# Patient Record
Sex: FEMALE | Race: WHITE | NOT HISPANIC OR LATINO | Employment: UNEMPLOYED | URBAN - METROPOLITAN AREA
[De-identification: names, ages, dates, MRNs, and addresses within clinical notes are randomized per-mention and may not be internally consistent; named-entity substitution may affect disease eponyms.]

---

## 2024-10-16 ENCOUNTER — LAB VISIT (OUTPATIENT)
Dept: LAB | Facility: HOSPITAL | Age: 29
End: 2024-10-16
Attending: NURSE PRACTITIONER
Payer: COMMERCIAL

## 2024-10-16 ENCOUNTER — OFFICE VISIT (OUTPATIENT)
Dept: FAMILY MEDICINE | Facility: CLINIC | Age: 29
End: 2024-10-16
Payer: COMMERCIAL

## 2024-10-16 VITALS
SYSTOLIC BLOOD PRESSURE: 112 MMHG | DIASTOLIC BLOOD PRESSURE: 80 MMHG | HEIGHT: 65 IN | HEART RATE: 65 BPM | TEMPERATURE: 98 F | WEIGHT: 225.38 LBS | BODY MASS INDEX: 37.55 KG/M2

## 2024-10-16 DIAGNOSIS — E66.812 CLASS 2 OBESITY WITHOUT SERIOUS COMORBIDITY WITH BODY MASS INDEX (BMI) OF 37.0 TO 37.9 IN ADULT, UNSPECIFIED OBESITY TYPE: ICD-10-CM

## 2024-10-16 DIAGNOSIS — K76.0 FATTY LIVER: ICD-10-CM

## 2024-10-16 DIAGNOSIS — R10.30 LOWER ABDOMINAL PAIN: ICD-10-CM

## 2024-10-16 DIAGNOSIS — F12.90 MARIJUANA USE: ICD-10-CM

## 2024-10-16 DIAGNOSIS — Z00.00 ENCOUNTER FOR WELLNESS EXAMINATION: ICD-10-CM

## 2024-10-16 DIAGNOSIS — Z13.29 SCREENING FOR THYROID DISORDER: ICD-10-CM

## 2024-10-16 DIAGNOSIS — R19.4 CHANGE IN BOWEL HABITS: ICD-10-CM

## 2024-10-16 DIAGNOSIS — Z00.00 ENCOUNTER FOR WELLNESS EXAMINATION: Primary | ICD-10-CM

## 2024-10-16 DIAGNOSIS — Z13.220 SCREENING FOR LIPID DISORDERS: ICD-10-CM

## 2024-10-16 PROBLEM — Z11.4 ENCOUNTER FOR SCREENING FOR HIV: Status: RESOLVED | Noted: 2020-02-20 | Resolved: 2024-10-16

## 2024-10-16 PROBLEM — R10.32 LLQ PAIN: Status: RESOLVED | Noted: 2021-05-10 | Resolved: 2024-10-16

## 2024-10-16 PROBLEM — R10.9 ABDOMINAL PAIN: Chronic | Status: RESOLVED | Noted: 2022-05-23 | Resolved: 2024-10-16

## 2024-10-16 PROBLEM — Z11.8 SCREENING FOR CHLAMYDIAL DISEASE: Status: RESOLVED | Noted: 2019-09-13 | Resolved: 2024-10-16

## 2024-10-16 PROBLEM — Z76.89 REFERRAL OF PATIENT: Status: RESOLVED | Noted: 2022-05-23 | Resolved: 2024-10-16

## 2024-10-16 LAB
ALBUMIN SERPL BCP-MCNC: 3.9 G/DL (ref 3.5–5.2)
ALP SERPL-CCNC: 51 U/L (ref 55–135)
ALT SERPL W/O P-5'-P-CCNC: 29 U/L (ref 10–44)
ANION GAP SERPL CALC-SCNC: 10 MMOL/L (ref 8–16)
AST SERPL-CCNC: 21 U/L (ref 10–40)
BILIRUB SERPL-MCNC: 0.4 MG/DL (ref 0.1–1)
BUN SERPL-MCNC: 10 MG/DL (ref 6–20)
CALCIUM SERPL-MCNC: 9.1 MG/DL (ref 8.7–10.5)
CHLORIDE SERPL-SCNC: 106 MMOL/L (ref 95–110)
CHOLEST SERPL-MCNC: 212 MG/DL (ref 120–199)
CHOLEST/HDLC SERPL: 4.2 {RATIO} (ref 2–5)
CO2 SERPL-SCNC: 24 MMOL/L (ref 23–29)
CREAT SERPL-MCNC: 0.8 MG/DL (ref 0.5–1.4)
ERYTHROCYTE [DISTWIDTH] IN BLOOD BY AUTOMATED COUNT: 13 % (ref 11.5–14.5)
EST. GFR  (NO RACE VARIABLE): >60 ML/MIN/1.73 M^2
ESTIMATED AVG GLUCOSE: 105 MG/DL (ref 68–131)
GLUCOSE SERPL-MCNC: 93 MG/DL (ref 70–110)
HBA1C MFR BLD: 5.3 % (ref 4–5.6)
HCT VFR BLD AUTO: 42.7 % (ref 37–48.5)
HCV AB SERPL QL IA: NORMAL
HDLC SERPL-MCNC: 50 MG/DL (ref 40–75)
HDLC SERPL: 23.6 % (ref 20–50)
HGB BLD-MCNC: 13.9 G/DL (ref 12–16)
HIV 1+2 AB+HIV1 P24 AG SERPL QL IA: NORMAL
LDLC SERPL CALC-MCNC: 137.2 MG/DL (ref 63–159)
MCH RBC QN AUTO: 29.1 PG (ref 27–31)
MCHC RBC AUTO-ENTMCNC: 32.6 G/DL (ref 32–36)
MCV RBC AUTO: 90 FL (ref 82–98)
NONHDLC SERPL-MCNC: 162 MG/DL
PLATELET # BLD AUTO: 320 K/UL (ref 150–450)
PMV BLD AUTO: 9.6 FL (ref 9.2–12.9)
POTASSIUM SERPL-SCNC: 4.3 MMOL/L (ref 3.5–5.1)
PROT SERPL-MCNC: 7.2 G/DL (ref 6–8.4)
RBC # BLD AUTO: 4.77 M/UL (ref 4–5.4)
SODIUM SERPL-SCNC: 140 MMOL/L (ref 136–145)
TRIGL SERPL-MCNC: 124 MG/DL (ref 30–150)
TSH SERPL DL<=0.005 MIU/L-ACNC: 1.34 UIU/ML (ref 0.4–4)
WBC # BLD AUTO: 6.97 K/UL (ref 3.9–12.7)

## 2024-10-16 PROCEDURE — 87389 HIV-1 AG W/HIV-1&-2 AB AG IA: CPT | Performed by: NURSE PRACTITIONER

## 2024-10-16 PROCEDURE — 86803 HEPATITIS C AB TEST: CPT | Performed by: NURSE PRACTITIONER

## 2024-10-16 PROCEDURE — 1159F MED LIST DOCD IN RCRD: CPT | Mod: CPTII,S$GLB,, | Performed by: NURSE PRACTITIONER

## 2024-10-16 PROCEDURE — 3008F BODY MASS INDEX DOCD: CPT | Mod: CPTII,S$GLB,, | Performed by: NURSE PRACTITIONER

## 2024-10-16 PROCEDURE — 85027 COMPLETE CBC AUTOMATED: CPT | Performed by: NURSE PRACTITIONER

## 2024-10-16 PROCEDURE — 3074F SYST BP LT 130 MM HG: CPT | Mod: CPTII,S$GLB,, | Performed by: NURSE PRACTITIONER

## 2024-10-16 PROCEDURE — 3079F DIAST BP 80-89 MM HG: CPT | Mod: CPTII,S$GLB,, | Performed by: NURSE PRACTITIONER

## 2024-10-16 PROCEDURE — 83036 HEMOGLOBIN GLYCOSYLATED A1C: CPT | Performed by: NURSE PRACTITIONER

## 2024-10-16 PROCEDURE — 1160F RVW MEDS BY RX/DR IN RCRD: CPT | Mod: CPTII,S$GLB,, | Performed by: NURSE PRACTITIONER

## 2024-10-16 PROCEDURE — 99395 PREV VISIT EST AGE 18-39: CPT | Mod: S$GLB,,, | Performed by: NURSE PRACTITIONER

## 2024-10-16 PROCEDURE — 80061 LIPID PANEL: CPT | Performed by: NURSE PRACTITIONER

## 2024-10-16 PROCEDURE — 80053 COMPREHEN METABOLIC PANEL: CPT | Performed by: NURSE PRACTITIONER

## 2024-10-16 PROCEDURE — 99999 PR PBB SHADOW E&M-EST. PATIENT-LVL IV: CPT | Mod: PBBFAC,,, | Performed by: NURSE PRACTITIONER

## 2024-10-16 PROCEDURE — 84443 ASSAY THYROID STIM HORMONE: CPT | Performed by: NURSE PRACTITIONER

## 2024-10-16 PROCEDURE — 36415 COLL VENOUS BLD VENIPUNCTURE: CPT | Mod: PO | Performed by: NURSE PRACTITIONER
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
## 2024-10-17 ENCOUNTER — LAB VISIT (OUTPATIENT)
Dept: LAB | Facility: HOSPITAL | Age: 29
End: 2024-10-17
Attending: NURSE PRACTITIONER
Payer: COMMERCIAL

## 2024-10-17 ENCOUNTER — OFFICE VISIT (OUTPATIENT)
Dept: GASTROENTEROLOGY | Facility: CLINIC | Age: 29
End: 2024-10-17
Payer: COMMERCIAL

## 2024-10-17 VITALS — BODY MASS INDEX: 37.87 KG/M2 | HEIGHT: 65 IN | WEIGHT: 227.31 LBS

## 2024-10-17 DIAGNOSIS — F12.90 MARIJUANA USE: ICD-10-CM

## 2024-10-17 DIAGNOSIS — K92.1 HEMATOCHEZIA: ICD-10-CM

## 2024-10-17 DIAGNOSIS — Z87.898 HISTORY OF NAUSEA AND VOMITING: ICD-10-CM

## 2024-10-17 DIAGNOSIS — R19.7 DIARRHEA, UNSPECIFIED TYPE: ICD-10-CM

## 2024-10-17 DIAGNOSIS — R10.9 LEFT SIDED ABDOMINAL PAIN: ICD-10-CM

## 2024-10-17 DIAGNOSIS — K62.89 RECTAL PAIN: ICD-10-CM

## 2024-10-17 DIAGNOSIS — R12 HEARTBURN: ICD-10-CM

## 2024-10-17 DIAGNOSIS — R19.4 CHANGE IN BOWEL HABITS: Primary | ICD-10-CM

## 2024-10-17 DIAGNOSIS — R14.0 BLOATING SYMPTOM: ICD-10-CM

## 2024-10-17 LAB
IGA SERPL-MCNC: 218 MG/DL (ref 40–350)
LIPASE SERPL-CCNC: 17 U/L (ref 4–60)

## 2024-10-17 PROCEDURE — 3044F HG A1C LEVEL LT 7.0%: CPT | Mod: CPTII,S$GLB,, | Performed by: NURSE PRACTITIONER

## 2024-10-17 PROCEDURE — 83690 ASSAY OF LIPASE: CPT | Performed by: NURSE PRACTITIONER

## 2024-10-17 PROCEDURE — 82784 ASSAY IGA/IGD/IGG/IGM EACH: CPT | Performed by: NURSE PRACTITIONER

## 2024-10-17 PROCEDURE — 86364 TISS TRNSGLTMNASE EA IG CLAS: CPT | Performed by: NURSE PRACTITIONER

## 2024-10-17 PROCEDURE — 99204 OFFICE O/P NEW MOD 45 MIN: CPT | Mod: S$GLB,,, | Performed by: NURSE PRACTITIONER

## 2024-10-17 PROCEDURE — 1159F MED LIST DOCD IN RCRD: CPT | Mod: CPTII,S$GLB,, | Performed by: NURSE PRACTITIONER

## 2024-10-17 PROCEDURE — 36415 COLL VENOUS BLD VENIPUNCTURE: CPT | Mod: PO | Performed by: NURSE PRACTITIONER

## 2024-10-17 PROCEDURE — 3008F BODY MASS INDEX DOCD: CPT | Mod: CPTII,S$GLB,, | Performed by: NURSE PRACTITIONER

## 2024-10-17 PROCEDURE — 1160F RVW MEDS BY RX/DR IN RCRD: CPT | Mod: CPTII,S$GLB,, | Performed by: NURSE PRACTITIONER

## 2024-10-17 PROCEDURE — 99999 PR PBB SHADOW E&M-EST. PATIENT-LVL III: CPT | Mod: PBBFAC,,, | Performed by: NURSE PRACTITIONER

## 2024-10-17 RX ORDER — OMEPRAZOLE 40 MG/1
40 CAPSULE, DELAYED RELEASE ORAL
Qty: 30 CAPSULE | Refills: 1 | Status: SHIPPED | OUTPATIENT
Start: 2024-10-17

## 2024-10-17 NOTE — PROGRESS NOTES
Subjective:       Patient ID: Guilherme Davenport is a 29 y.o. female Body mass index is 37.82 kg/m².    Chief Complaint: Abdominal Pain, Bloated, Diarrhea, and Nausea    This patient is new to me.  Referring Provider: Azalia Barnes for change in bowel habits.  Established patient of DOMINIQUE Harrington NP (last in 2019).     Reports she was living in [place], but moved back to [place] recently. Also reports she recently got insurance. Patient reports chronic GI symptoms since ~2017. In the apst had more nausea and vomiting, but that has significantly improved several months ago and recently abdominal pain & diarrhea has worsened.    GI Problem  The primary symptoms include abdominal pain (has been to the ER for it in the past), nausea, vomiting, diarrhea and hematochezia (occasional, patient thinks it is from irritation). Primary symptoms do not include fever, weight loss (reports she had lost ~20 lbs 2 years ago, but has gained about 50 lbs over the past year; denies change in diet or activity; reports she exercises and eats a healthy diet), fatigue, melena, hematemesis, jaundice or dysuria.   The abdominal pain began more than 2 days ago (chronic intermittent since 2017). Progression: worsened ~6 weeks ago. The abdominal pain is located in the LLQ and LUQ (described as soreness). The severity of the abdominal pain is 3/10 (currently). Relieved by: worse with dairy.   Nausea began more than 1 week ago (chronic intermittent since 2017).   The vomiting began more than 2 days ago (chronic intermittent since 2017). Frequency: at times would wake up from sleeping to vomit; intermittent, last had ~2/2024. The emesis contains stomach contents and bilious material.   The diarrhea began more than 1 week ago (started at least since 2017, gradually worsening over the past year). Diarrhea characteristics: rated 6-7 on bristol stool scale. The diarrhea occurs 5 to 10 times per day (wakes up every morning around 3 am to have a  loose stool). Risk factors: denies recent antibiotic/hospitalization, foreign travel, ill contacts, or suspect food intake.   The hematochezia began more than 1 week ago (saw pediatrician for it in the past, around 23 years old). Frequency: intermittent, relates to times when she has rectal pain, occurs once every few months; last had ~8/2024 which was a moderate amount of bright red blood on tissue and in bowl; denies bleeding in between bowel movements. The hematochezia is a chronic problem.   The illness is also significant for bloating and tenesmus. The illness does not include chills, dysphagia, odynophagia or constipation. Associated symptoms comments: TREATMENT: intermittent fasting, tried dairy & gluten free diet- mild relief  PAST TREATMENT: protonix & prilosec- both helped. Significant associated medical issues include GERD (occurs almost daily).     Review of Systems   Constitutional:  Negative for appetite change, chills, fatigue, fever and weight loss (reports she had lost ~20 lbs 2 years ago, but has gained about 50 lbs over the past year; denies change in diet or activity; reports she exercises and eats a healthy diet).        Used to take a lot of motrin with history of endometriosis, stopped taking it ~2019.   HENT:  Negative for sore throat and trouble swallowing.    Respiratory:  Negative for cough, choking and shortness of breath.    Cardiovascular:  Negative for chest pain.   Gastrointestinal:  Positive for abdominal pain (has been to the ER for it in the past), anal bleeding, bloating, diarrhea, hematochezia (occasional, patient thinks it is from irritation), nausea, rectal pain (occasional; denies currently) and vomiting. Negative for constipation, dysphagia, hematemesis, jaundice and melena.   Genitourinary:  Negative for difficulty urinating, dysuria and flank pain.   Neurological:  Negative for weakness.       Patient's last menstrual period was 10/02/2024.  Past Medical History:    Diagnosis Date    Acquired hypothyroidism 09/13/2019    New diagnosis.  Duration is unknown.  Patient having symptoms of fatigue hair loss or weight gain.  Lab Results Component Value Date  TSH 4.364 (H) 08/07/2019  FREET4 0.91 08/07/2019     Encounter for long-term (current) use of medications 09/13/2019 09/13/2019  Patient is on CHRONIC long-term drug therapy for managed conditions. See medication list. Reports compliance.  No side effects reported.  Routine lab work is being monitored.  Patient does need refills today.   Lab Results Component Value Date  WBC 5.76 08/07/2019  HGB 11.2 (L) 08/07/2019  HCT 36.3 (L) 08/07/2019  MCV 78 (L) 08/07/2019   (H) 08/07/2019   CMP Sodium Date Value Re    Endometriosis, unspecified     Fatty liver 09/13/2019    EXAMINATION: US ABDOMEN LIMITED  CLINICAL HISTORY: Nausea with vomiting, unspecified  COMPARISON: None  FINDINGS: Limited right upper quadrant ultrasound performed.  The liver measures 15.4 cm in length and demonstrates increased echogenicity consistent with hepatic steatosis.  Portal vein is patent.  Common bile duct is within normal limits at 3 mm..  No gallstones, gallbladder wall thickening, o    Hyperlipidemia 09/13/2019    Chronic.  Uncontrolled.  No results found for: CHOL Lab Results Component Value Date  HDL 71 11/08/2018  Lab Results Component Value Date  LDLCALC 116 11/08/2018  Lab Results Component Value Date  TRIG 290 11/08/2018  No results found for: CHOLHDL      Microcytic anemia 09/13/2019    Chronic. Untreated.  Lab Results Component Value Date  WBC 5.76 08/07/2019  HGB 11.2 (L) 08/07/2019  HCT 36.3 (L) 08/07/2019  MCV 78 (L) 08/07/2019   (H) 08/07/2019       Past Surgical History:   Procedure Laterality Date    SURGICAL REMOVAL OF ENDOMETRIOSIS Bilateral 2017    Dr. Calderon Heroman    TONSILLECTOMY  2002     Family History   Problem Relation Name Age of Onset    Diabetes Mother Kristyn Brar     Hashimoto's thyroiditis Mother Kristyn Brar      Depression Father Guilherme Brar     Hearing loss Father Guilherme Brar     Gallbladder disease Sister      Arthritis Maternal Grandmother Tanna Garrett     Diabetes Maternal Grandmother Tanna Garrett     Stroke Paternal Grandmother Tisha Brar     Colon cancer Neg Hx      Crohn's disease Neg Hx      Esophageal cancer Neg Hx      Stomach cancer Neg Hx      Ulcerative colitis Neg Hx      Celiac disease Neg Hx       Social History     Tobacco Use    Smoking status: Never    Smokeless tobacco: Never   Substance Use Topics    Alcohol use: Not Currently     Comment: rarely    Drug use: Yes     Frequency: 7.0 times per week     Types: Marijuana     Comment: daily     Wt Readings from Last 10 Encounters:   10/17/24 103.1 kg (227 lb 4.7 oz)   10/16/24 102.2 kg (225 lb 6.4 oz)   05/23/22 102.1 kg (225 lb)   01/12/22 105.3 kg (232 lb 2.3 oz)   05/10/21 102.4 kg (225 lb 12.8 oz)   02/20/20 108.2 kg (238 lb 9.6 oz)   10/11/19 104.1 kg (229 lb 9.6 oz)   09/13/19 103.6 kg (228 lb 6.4 oz)   08/16/19 101.3 kg (223 lb 5.2 oz)   08/07/19 100 kg (220 lb 7.4 oz)     Lab Results   Component Value Date    WBC 6.97 10/16/2024    HGB 13.9 10/16/2024    HCT 42.7 10/16/2024    MCV 90 10/16/2024     10/16/2024     CMP  Sodium   Date Value Ref Range Status   10/16/2024 140 136 - 145 mmol/L Final     Potassium   Date Value Ref Range Status   10/16/2024 4.3 3.5 - 5.1 mmol/L Final     Chloride   Date Value Ref Range Status   10/16/2024 106 95 - 110 mmol/L Final     CO2   Date Value Ref Range Status   10/16/2024 24 23 - 29 mmol/L Final     Glucose   Date Value Ref Range Status   10/16/2024 93 70 - 110 mg/dL Final     BUN   Date Value Ref Range Status   10/16/2024 10 6 - 20 mg/dL Final     Creatinine   Date Value Ref Range Status   10/16/2024 0.8 0.5 - 1.4 mg/dL Final     Calcium   Date Value Ref Range Status   10/16/2024 9.1 8.7 - 10.5 mg/dL Final     Total Protein   Date Value Ref Range Status   10/16/2024 7.2 6.0 - 8.4 g/dL Final      Albumin   Date Value Ref Range Status   10/16/2024 3.9 3.5 - 5.2 g/dL Final     Total Bilirubin   Date Value Ref Range Status   10/16/2024 0.4 0.1 - 1.0 mg/dL Final     Comment:     For infants and newborns, interpretation of results should be based  on gestational age, weight and in agreement with clinical  observations.    Premature Infant recommended reference ranges:  Up to 24 hours.............<8.0 mg/dL  Up to 48 hours............<12.0 mg/dL  3-5 days..................<15.0 mg/dL  6-29 days.................<15.0 mg/dL       Alkaline Phosphatase   Date Value Ref Range Status   10/16/2024 51 (L) 55 - 135 U/L Final     AST   Date Value Ref Range Status   10/16/2024 21 10 - 40 U/L Final     ALT   Date Value Ref Range Status   10/16/2024 29 10 - 44 U/L Final     Anion Gap   Date Value Ref Range Status   10/16/2024 10 8 - 16 mmol/L Final     eGFR if    Date Value Ref Range Status   05/10/2021 >60.0 >60 mL/min/1.73 m^2 Final     eGFR if non    Date Value Ref Range Status   05/10/2021 >60.0 >60 mL/min/1.73 m^2 Final     Comment:     Calculation used to obtain the estimated glomerular filtration  rate (eGFR) is the CKD-EPI equation.        Lab Results   Component Value Date    HEPCAB Non-reactive 10/16/2024     Lab Results   Component Value Date    LIPASE 26 08/07/2019     Lab Results   Component Value Date    TSH 1.337 10/16/2024     Reviewed prior medical records including radiology report of 10/17/2024 CT abdomen pelvis; 1/12/2022 pelvic ultrasound; 8/16/2019 limited abdominal ultrasound and GI visit note with DOMINIQUE Harrington NP.    Objective:      Physical Exam  Vitals and nursing note reviewed.   Constitutional:       General: She is not in acute distress.     Appearance: Normal appearance. She is well-developed. She is not diaphoretic.   HENT:      Mouth/Throat:      Lips: Pink. No lesions.      Mouth: Mucous membranes are moist. No oral lesions.      Tongue: No lesions.      Pharynx:  Oropharynx is clear. No pharyngeal swelling or posterior oropharyngeal erythema.   Eyes:      General: No scleral icterus.     Conjunctiva/sclera: Conjunctivae normal.   Pulmonary:      Effort: Pulmonary effort is normal. No respiratory distress.      Breath sounds: Normal breath sounds. No wheezing.   Abdominal:      General: Bowel sounds are normal. There is no distension or abdominal bruit.      Palpations: Abdomen is soft. Abdomen is not rigid. There is no mass.      Tenderness: There is abdominal tenderness (mild) in the right upper quadrant, epigastric area, left upper quadrant and left lower quadrant. There is no guarding or rebound.      Comments: Deferred rectal examination.   Skin:     General: Skin is warm and dry.      Coloration: Skin is not jaundiced or pale.      Findings: No erythema or rash.   Neurological:      Mental Status: She is alert and oriented to person, place, and time.   Psychiatric:         Behavior: Behavior normal.         Thought Content: Thought content normal.         Judgment: Judgment normal.         Assessment:       1. Change in bowel habits    2. Diarrhea, unspecified type    3. Bloating symptom    4. Hematochezia    5. Rectal pain    6. Left sided abdominal pain    7. Heartburn    8. History of nausea and vomiting    9. Marijuana use        Plan:       Change in bowel habits: Diarrhea, unspecified type  -     Tissue Transglutaminase, IgA; Future; Expected date: 10/17/2024  -     IgA; Future; Expected date: 10/17/2024  -     Stool Exam-Ova,Cysts,Parasites; Future; Expected date: 10/17/2024  -     Giardia / Cryptosporidum, EIA; Future; Expected date: 10/17/2024  -     WBC, Stool; Future; Expected date: 10/17/2024  -     Stool culture; Future; Expected date: 10/17/2024  -     Clostridium difficile EIA; Future; Expected date: 10/17/2024  - schedule Colonoscopy, discussed procedure with the patient, including risks and benefits, patient verbalized understanding  - recommend OTC  probiotic, such as Florastor or Culturelle, taken as directed on packaging  - avoid lactose, alcohol, & caffeine  - avoid known triggers  - recommended increase fiber in diet, especially soluble fiber since this can help bulk up the stool consistency and may help to slow down how fast the stool goes through the colon and can prevent diarrhea; Recommend high fiber diet (20-30 grams of fiber daily)/OTC fiber supplements daily as directed, such as Benefiber.  - discussed about trial of medication to treat diarrhea/possible IBS; patient verbalized understanding but patient declined prescription for diarrhea at this time    Bloating symptom  - schedule EGD, discussed procedure with patient, including risks and benefits, patient verbalized understanding  - schedule Colonoscopy, discussed procedure with the patient, including risks and benefits, patient verbalized understanding  - recommend OTC simethicone as directed, such as Phazyme or Gas-x  - recommend low gas diet: Reduce or eliminate these foods from your diet: Broccoli, Cauliflower, [place] sprouts, Cabbage, Cooked dried beans, Carbonated beverages (sparkling water, soda, beer, champagne)  Other Causes Of Excess Gas Include:   1) EATING TOO FAST or TALKING WHILE YOU CHEW may cause you to swallow air. This increases the amount of gas in the stomach and may worsen your symptoms.  --> Chew each mouthful completely before swallowing. Take your time.  2) OVEREATING may increase the feeling of being bloated and cause more gas.  --> When you are full, stop eating.  3) CONSTIPATION can increase the amount of normal intestinal gas.  --> Avoid constipation by increasing the amount of fiber in your diet by including whole cereal grains, fresh vegetables (except those in the above list) and fresh fruits. High-fiber foods absorb water and carry it out of the body. When increasing the amount of fiber in your diet, you also need to increase the amount of water that you drink. You  should drink at least eight 8-ounce glasses of water (two quarts) per day.    Hematochezia & Rectal pain  - schedule Colonoscopy, discussed procedure with the patient, including risks and benefits, patient verbalized understanding  - discussed about different etiologies that can cause rectal bleeding, such as but not limited to diverticulosis, polyps, colon mass, colon inflammation or infection, anal fissure or hemorrhoids.   - You may resume normal activity as long as you feel well.  - Avoid/minimize NSAIDs such as ibuprofen (Advil, Motrin) and naproxen (Aleve and Naprosyn).  - Avoid alcohol.  - avoid constipation and straining with bowel movements; try using an OTC stool softener as directed and increase fiber in diet (20-30 grams daily)/OTC fiber supplement such as metamucil (take as directed)  - recommend SITZ baths  - possible referral to colorectal surgery if symptoms persist    Left sided abdominal pain  -     Lipase; Future; Expected date: 10/17/2024  -   RESTART  omeprazole (PRILOSEC) 40 MG capsule; Take 1 capsule (40 mg total) by mouth before breakfast.  Dispense: 30 capsule; Refill: 1  - schedule EGD, discussed procedure with patient, including risks and benefits, patient verbalized understanding  - schedule Colonoscopy, discussed procedure with the patient, including risks and benefits, patient verbalized understanding  - avoid/minimize use of NSAIDs- since they can cause GI upset, bleeding and/or ulcers. If NSAID must be taken, recommend take with food.    Heartburn  -  RESTART   omeprazole (PRILOSEC) 40 MG capsule; Take 1 capsule (40 mg total) by mouth before breakfast.  Dispense: 30 capsule; Refill: 1  - schedule EGD, discussed procedure with patient, including risks and benefits, patient verbalized understanding    History of nausea and vomiting  -     Lipase; Future; Expected date: 10/17/2024  - RESTART    omeprazole (PRILOSEC) 40 MG capsule; Take 1 capsule (40 mg total) by mouth before breakfast.   Dispense: 30 capsule; Refill: 1  - schedule EGD, discussed procedure with patient, including risks and benefits, patient verbalized understanding    Marijuana use  - discussed about possible cannabis hyperemesis syndrome; recommend avoid marijuana use    Follow up in about 1 month (around 11/17/2024), or if symptoms worsen or fail to improve.      If no improvement in symptoms or symptoms worsen, call/follow-up at clinic or go to ER.        48 minutes of total time spent on the encounter, which includes face to face time and non-face to face time preparing to see the patient (e.g., review of tests), Obtaining and/or reviewing separately obtained history, Documenting clinical information in the electronic or other health record, Independently interpreting results (not separately reported) and communicating results to the patient/family/caregiver, or Care coordination (not separately reported).

## 2024-10-18 ENCOUNTER — LAB VISIT (OUTPATIENT)
Dept: LAB | Facility: HOSPITAL | Age: 29
End: 2024-10-18
Payer: COMMERCIAL

## 2024-10-18 DIAGNOSIS — R19.7 DIARRHEA, UNSPECIFIED TYPE: ICD-10-CM

## 2024-10-18 LAB
C DIFF GDH STL QL: NEGATIVE
C DIFF TOX A+B STL QL IA: NEGATIVE
WBC #/AREA STL HPF: NORMAL /[HPF]

## 2024-10-18 PROCEDURE — 87177 OVA AND PARASITES SMEARS: CPT | Performed by: NURSE PRACTITIONER

## 2024-10-18 PROCEDURE — 87045 FECES CULTURE AEROBIC BACT: CPT | Performed by: NURSE PRACTITIONER

## 2024-10-18 PROCEDURE — 87324 CLOSTRIDIUM AG IA: CPT | Performed by: NURSE PRACTITIONER

## 2024-10-18 PROCEDURE — 87449 NOS EACH ORGANISM AG IA: CPT | Mod: 91 | Performed by: NURSE PRACTITIONER

## 2024-10-18 PROCEDURE — 89055 LEUKOCYTE ASSESSMENT FECAL: CPT | Performed by: NURSE PRACTITIONER

## 2024-10-18 PROCEDURE — 87046 STOOL CULTR AEROBIC BACT EA: CPT | Performed by: NURSE PRACTITIONER

## 2024-10-18 PROCEDURE — 87209 SMEAR COMPLEX STAIN: CPT | Performed by: NURSE PRACTITIONER

## 2024-10-18 PROCEDURE — 87449 NOS EACH ORGANISM AG IA: CPT | Performed by: NURSE PRACTITIONER

## 2024-10-18 PROCEDURE — 87427 SHIGA-LIKE TOXIN AG IA: CPT | Performed by: NURSE PRACTITIONER

## 2024-10-18 PROCEDURE — 87329 GIARDIA AG IA: CPT | Performed by: NURSE PRACTITIONER

## 2024-10-19 LAB
CRYPTOSP AG STL QL IA: NEGATIVE
E COLI SXT1 STL QL IA: NEGATIVE
E COLI SXT2 STL QL IA: NEGATIVE
G LAMBLIA AG STL QL IA: NEGATIVE

## 2024-10-20 LAB — BACTERIA STL CULT: NORMAL

## 2024-10-21 LAB — TTG IGA SER-ACNC: 0.4 U/ML

## 2024-10-25 LAB — O+P STL MICRO: NORMAL

## 2024-11-19 ENCOUNTER — OFFICE VISIT (OUTPATIENT)
Dept: FAMILY MEDICINE | Facility: CLINIC | Age: 29
End: 2024-11-19
Payer: COMMERCIAL

## 2024-11-19 VITALS
HEART RATE: 68 BPM | TEMPERATURE: 98 F | HEIGHT: 65 IN | SYSTOLIC BLOOD PRESSURE: 122 MMHG | WEIGHT: 224.69 LBS | RESPIRATION RATE: 18 BRPM | OXYGEN SATURATION: 99 % | DIASTOLIC BLOOD PRESSURE: 82 MMHG | BODY MASS INDEX: 37.44 KG/M2

## 2024-11-19 DIAGNOSIS — R05.9 COUGH, UNSPECIFIED TYPE: ICD-10-CM

## 2024-11-19 DIAGNOSIS — J06.9 UPPER RESPIRATORY TRACT INFECTION, UNSPECIFIED TYPE: Primary | ICD-10-CM

## 2024-11-19 DIAGNOSIS — J02.9 SORE THROAT: ICD-10-CM

## 2024-11-19 LAB
CTP QC/QA: YES
CTP QC/QA: YES
POC MOLECULAR INFLUENZA A AGN: NEGATIVE
POC MOLECULAR INFLUENZA B AGN: NEGATIVE
S PYO RRNA THROAT QL PROBE: NEGATIVE

## 2024-11-19 PROCEDURE — 87502 INFLUENZA DNA AMP PROBE: CPT | Mod: QW,S$GLB,, | Performed by: DIETITIAN, REGISTERED

## 2024-11-19 PROCEDURE — 1159F MED LIST DOCD IN RCRD: CPT | Mod: CPTII,S$GLB,, | Performed by: DIETITIAN, REGISTERED

## 2024-11-19 PROCEDURE — 3079F DIAST BP 80-89 MM HG: CPT | Mod: CPTII,S$GLB,, | Performed by: DIETITIAN, REGISTERED

## 2024-11-19 PROCEDURE — 3074F SYST BP LT 130 MM HG: CPT | Mod: CPTII,S$GLB,, | Performed by: DIETITIAN, REGISTERED

## 2024-11-19 PROCEDURE — 87880 STREP A ASSAY W/OPTIC: CPT | Mod: QW,S$GLB,, | Performed by: DIETITIAN, REGISTERED

## 2024-11-19 PROCEDURE — 99999 PR PBB SHADOW E&M-EST. PATIENT-LVL III: CPT | Mod: PBBFAC,,, | Performed by: DIETITIAN, REGISTERED

## 2024-11-19 PROCEDURE — 99213 OFFICE O/P EST LOW 20 MIN: CPT | Mod: S$GLB,,, | Performed by: DIETITIAN, REGISTERED

## 2024-11-19 PROCEDURE — 1160F RVW MEDS BY RX/DR IN RCRD: CPT | Mod: CPTII,S$GLB,, | Performed by: DIETITIAN, REGISTERED

## 2024-11-19 PROCEDURE — 3008F BODY MASS INDEX DOCD: CPT | Mod: CPTII,S$GLB,, | Performed by: DIETITIAN, REGISTERED

## 2024-11-19 PROCEDURE — 3044F HG A1C LEVEL LT 7.0%: CPT | Mod: CPTII,S$GLB,, | Performed by: DIETITIAN, REGISTERED

## 2024-11-19 RX ORDER — FLUTICASONE PROPIONATE 50 MCG
1 SPRAY, SUSPENSION (ML) NASAL DAILY
Qty: 18.2 ML | Refills: 0 | Status: SHIPPED | OUTPATIENT
Start: 2024-11-19

## 2024-11-19 RX ORDER — PROMETHAZINE HYDROCHLORIDE AND DEXTROMETHORPHAN HYDROBROMIDE 6.25; 15 MG/5ML; MG/5ML
5 SYRUP ORAL EVERY 4 HOURS PRN
Qty: 118 ML | Refills: 0 | Status: SHIPPED | OUTPATIENT
Start: 2024-11-19 | End: 2024-11-29

## 2024-11-19 RX ORDER — GUAIFENESIN 600 MG/1
1200 TABLET, EXTENDED RELEASE ORAL 2 TIMES DAILY
Qty: 40 TABLET | Refills: 0 | Status: SHIPPED | OUTPATIENT
Start: 2024-11-19 | End: 2024-11-29

## 2024-11-19 NOTE — PROGRESS NOTES
PLAN:    Assessment & Plan    Assessed patient's symptoms and recent negative test results for strep, flu, and COVID-19  Determined likely viral etiology based on symptom duration and presentation  Considered steroid injection but deemed unnecessary given current symptoms  Opted for conservative management with symptomatic treatment and monitoring    ACUTE PHARYNGITIS:  - Educated on proper use of nasal saline and Flonase for congestion relief.  - Explained viral nature of illness and expected course.  - Patient to increase vitamin C intake.  - Patient to increase fluid intake.  - Patient to get plenty of rest.  - Started guaifenesin for mucus.  - Started Flonase nasal spray.  - Started Promethazine DM for nighttime cough, if needed.  - Started OTC Simply Saline nasal spray.  - Continued Tylenol and Advil as needed for fever or body aches.    FOLLOW-UP:  - Contact office if symptoms worsen or do not improve by Friday.  - Follow up via e-visit if still sick by Friday for possible antibiotic prescription.        Problem List Items Addressed This Visit    None  Visit Diagnoses       Upper respiratory tract infection, unspecified type    -  Primary    Relevant Medications    promethazine-dextromethorphan (PROMETHAZINE-DM) 6.25-15 mg/5 mL Syrp    guaiFENesin (MUCINEX) 600 mg 12 hr tablet    fluticasone propionate (FLONASE) 50 mcg/actuation nasal spray    Other Relevant Orders    POCT Rapid Strep A (Completed)    POCT Influenza A/B Molecular (Completed)    POCT COVID-19 Rapid Screening    Sore throat        Relevant Medications    fluticasone propionate (FLONASE) 50 mcg/actuation nasal spray    Other Relevant Orders    POCT Rapid Strep A (Completed)    Cough, unspecified type        Relevant Medications    promethazine-dextromethorphan (PROMETHAZINE-DM) 6.25-15 mg/5 mL Syrp    guaiFENesin (MUCINEX) 600 mg 12 hr tablet    fluticasone propionate (FLONASE) 50 mcg/actuation nasal spray    Other Relevant Orders    POCT  Influenza A/B Molecular (Completed)    POCT COVID-19 Rapid Screening          Future Appointments       Date Provider Specialty Appt Notes    11/20/2024 Roxie Collins MD Obstetrics and Gynecology reoccurring pain in left side ovary area; night sweats; PMDD and PCOS concerns    1/21/2025 Brenda Kaur, [facility] Gastroenterology follow up           Medication Management for assessment above:   Medication List with Changes/Refills   New Medications    FLUTICASONE PROPIONATE (FLONASE) 50 MCG/ACTUATION NASAL SPRAY    1 spray (50 mcg total) by Each Nostril route once daily.    GUAIFENESIN (MUCINEX) 600 MG 12 HR TABLET    Take 2 tablets (1,200 mg total) by mouth 2 (two) times daily. for 10 days    PROMETHAZINE-DEXTROMETHORPHAN (PROMETHAZINE-DM) 6.25-15 MG/5 ML SYRP    Take 5 mLs by mouth every 4 (four) hours as needed (cough).   Discontinued Medications    OMEPRAZOLE (PRILOSEC) 40 MG CAPSULE    Take 1 capsule (40 mg total) by mouth before breakfast.       Milena Brar, , RDN  ==========================================================================  Subjective:   Patient ID: Guilherme Davenport is a 29 y.o. female.  has a past medical history of Acquired hypothyroidism (09/13/2019), Encounter for long-term (current) use of medications (09/13/2019), Endometriosis, unspecified, Fatty liver (09/13/2019), Hyperlipidemia (09/13/2019), and Microcytic anemia (09/13/2019).   Chief Complaint: Sinus Problem (Sore throat, body aches, yellow mucus, and chills starting yesterday)      History of Present Illness    CHIEF COMPLAINT:  Patient presents today for evaluation of sore throat and other symptoms.    HISTORY OF PRESENT ILLNESS:  She reports a sore throat that started yesterday with a tickling sensation, progressing to pain with talking and other activities by last night. She experiences chest pain with deep breathing and describes mucus build-up at the back of her throat, but denies significant coughing. She  has had sleep disturbances, including waking up at 2 AM and being unable to fall back asleep, with alternating episodes of sweating and chills throughout the night. She denies difficulty falling asleep initially.    MEDICATIONS:  She has been taking Guaifenesin for mucus build-up and OTC cold and flu medicine, which she took yesterday morning and again at 2 AM this morning due to difficulty sleeping.    EXPOSURE HISTORY:  She reports potential exposure to illnesses during a recent weekend visit to her parents' house. Her mother currently has bronchitis and her father has COVID. She does not believe she has contracted COVID, noting that a test would likely have shown positive results by now, though she is still awaiting confirmation.    MEDICAL HISTORY:  She has a history of frequent strep throat as a child. She recently tested negative for celiac disease. She reports ongoing gastric issues and has a scheduled colonoscopy next month for further evaluation.      ROS:  General: -fever, +chills, -fatigue, -weight gain, -weight loss, +night sweats  Eyes: -vision changes, -redness, -discharge  ENT: -ear pain, -nasal congestion, +sore throat  Cardiovascular: +chest pain, -palpitations, -lower extremity edema  Respiratory: -cough, -shortness of breath  Gastrointestinal: -abdominal pain, -nausea, -vomiting, -diarrhea, -constipation, -blood in stool, +change in bowel habits  Genitourinary: -dysuria, -hematuria, -frequency  Musculoskeletal: -joint pain, -muscle pain  Skin: -rash, -lesion  Neurological: -headache, -dizziness, -numbness, -tingling  Psychiatric: -anxiety, -depression, -sleep difficulty          Problem List Items Addressed This Visit    None  Visit Diagnoses       Upper respiratory tract infection, unspecified type    -  Primary    Sore throat        Cough, unspecified type                 Review of patient's allergies indicates:  No Known Allergies  Current Outpatient Medications   Medication Instructions     "fluticasone propionate (FLONASE) 50 mcg, Each Nostril, Daily    guaiFENesin (MUCINEX) 1,200 mg, Oral, 2 times daily    promethazine-dextromethorphan (PROMETHAZINE-DM) 6.25-15 mg/5 mL Syrp 5 mLs, Oral, Every 4 hours PRN      I have reviewed the PMH, social history, FamilyHx, surgical history, allergies and medications documented / confirmed by the patient at the time of this visit.    Objective:   /82   Pulse 68   Temp 98 °F (36.7 °C)   Resp 18   Ht 5' 5" (1.651 m)   Wt 101.9 kg (224 lb 11.2 oz)   SpO2 99%   BMI 37.39 kg/m²   Physical Exam    General: No acute distress. Well-developed. Well-nourished.  Eyes: EOMI. Sclerae anicteric.  HENT: Normocephalic. Atraumatic. Nares patent. Moist oral mucosa.  Ears: Bilateral TMs serous effusion. Bilateral EACs clear.  Cardiovascular: Regular rate. Regular rhythm. No murmurs. No rubs. No gallops. Normal S1, S2.  Respiratory: Normal respiratory effort. Clear to auscultation bilaterally. No rales. No rhonchi. No wheezing.  Abdomen: Soft. Non-tender. Non-distended. Normoactive bowel sounds.  Musculoskeletal: No  obvious deformity.  Extremities: No lower extremity edema.  Neurological: Alert & oriented x3. No slurred speech. Normal gait.  Psychiatric: Normal mood. Normal affect. Good insight. Good judgment.  Skin: Warm. Dry. No rash.          Assessment:     1. Upper respiratory tract infection, unspecified type    2. Sore throat    3. Cough, unspecified type      MDM:   Moderate complexity, acute uncomplicated illness requiring medication management, unique testing with interpretation of results and document review: PCP notes.  Visit today included increased complexity associated with the care of the episodic problem see above assessment addressed and managing the longitudinal care of the patient due to the serious and/or complex managed problem(s) see above.    I have reviewed and summarized old records.  I have performed thorough medication reconciliation today and " discussed risk and benefits of medications.  I have reviewed labs and discussed with patient.  All questions were answered.    I have signed for the following orders AND/OR meds.  Orders Placed This Encounter   Procedures    POCT Rapid Strep A    POCT Influenza A/B Molecular    POCT COVID-19 Rapid Screening     Medications Ordered This Encounter   Medications    fluticasone propionate (FLONASE) 50 mcg/actuation nasal spray     Si spray (50 mcg total) by Each Nostril route once daily.     Dispense:  18.2 mL     Refill:  0    guaiFENesin (MUCINEX) 600 mg 12 hr tablet     Sig: Take 2 tablets (1,200 mg total) by mouth 2 (two) times daily. for 10 days     Dispense:  40 tablet     Refill:  0    promethazine-dextromethorphan (PROMETHAZINE-DM) 6.25-15 mg/5 mL Syrp     Sig: Take 5 mLs by mouth every 4 (four) hours as needed (cough).     Dispense:  118 mL     Refill:  0        No follow-ups on file.  Future Appointments       Date Provider Specialty Appt Notes    2024 Roxie Collins MD Obstetrics and Gynecology reoccurring pain in left side ovary area; night sweats; PMDD and PCOS concerns    2025 Brenda Kaur FNP Gastroenterology follow up            If no improvement in symptoms or symptoms worsen, advised to call/follow-up at clinic or go to ER. Patient voiced understanding and all questions/concerns were addressed.     DISCLAIMER: This note was compiled by using a speech recognition dictation system and therefore please be aware that typographical / speech recognition errors can and do occur.  Please contact me if you see any errors specifically.     This note was generated with the assistance of ambient listening technology. Verbal consent was obtained by the patient and accompanying visitor(s) for the recording of patient appointment to facilitate this note. I attest to having reviewed and edited the generated note for accuracy, though some syntax or spelling errors may persist. Please  contact the author of this note for any clarification.      Milena Brar DO, RDN    Office: [phone] [address] [place] [address]  FAX: [phone]

## 2024-11-20 ENCOUNTER — OFFICE VISIT (OUTPATIENT)
Dept: OBSTETRICS AND GYNECOLOGY | Facility: CLINIC | Age: 29
End: 2024-11-20
Payer: COMMERCIAL

## 2024-11-20 ENCOUNTER — LAB VISIT (OUTPATIENT)
Dept: LAB | Facility: OTHER | Age: 29
End: 2024-11-20
Attending: STUDENT IN AN ORGANIZED HEALTH CARE EDUCATION/TRAINING PROGRAM
Payer: COMMERCIAL

## 2024-11-20 ENCOUNTER — HOSPITAL ENCOUNTER (OUTPATIENT)
Dept: RADIOLOGY | Facility: HOSPITAL | Age: 29
Discharge: HOME | End: 2024-11-20
Attending: STUDENT IN AN ORGANIZED HEALTH CARE EDUCATION/TRAINING PROGRAM
Payer: COMMERCIAL

## 2024-11-20 VITALS
DIASTOLIC BLOOD PRESSURE: 82 MMHG | BODY MASS INDEX: 37.47 KG/M2 | WEIGHT: 224.88 LBS | HEIGHT: 65 IN | SYSTOLIC BLOOD PRESSURE: 124 MMHG

## 2024-11-20 DIAGNOSIS — Z83.2 FAMILY HISTORY OF FACTOR V DEFICIENCY: ICD-10-CM

## 2024-11-20 DIAGNOSIS — F32.81 PMDD (PREMENSTRUAL DYSPHORIC DISORDER): ICD-10-CM

## 2024-11-20 DIAGNOSIS — Z30.09 BIRTH CONTROL COUNSELING: ICD-10-CM

## 2024-11-20 DIAGNOSIS — L68.0 HIRSUTISM: ICD-10-CM

## 2024-11-20 DIAGNOSIS — R10.2 PELVIC PAIN: ICD-10-CM

## 2024-11-20 DIAGNOSIS — Z83.2 FAMILY HISTORY OF PROTEIN S DEFICIENCY: ICD-10-CM

## 2024-11-20 DIAGNOSIS — Z12.4 CERVICAL CANCER SCREENING: Primary | ICD-10-CM

## 2024-11-20 LAB
DHEA-S SERPL-MCNC: 414.2 UG/DL (ref 95.8–511.7)
ESTRADIOL SERPL-MCNC: 103 PG/ML
FSH SERPL-ACNC: 5.83 MIU/ML
INSULIN COLLECTION INTERVAL: NORMAL
INSULIN SERPL-ACNC: 15.8 UU/ML
LH SERPL-ACNC: 11.4 MIU/ML

## 2024-11-20 PROCEDURE — 83498 ASY HYDROXYPROGESTERONE 17-D: CPT | Performed by: STUDENT IN AN ORGANIZED HEALTH CARE EDUCATION/TRAINING PROGRAM

## 2024-11-20 PROCEDURE — 82627 DEHYDROEPIANDROSTERONE: CPT | Performed by: STUDENT IN AN ORGANIZED HEALTH CARE EDUCATION/TRAINING PROGRAM

## 2024-11-20 PROCEDURE — 83001 ASSAY OF GONADOTROPIN (FSH): CPT | Performed by: STUDENT IN AN ORGANIZED HEALTH CARE EDUCATION/TRAINING PROGRAM

## 2024-11-20 PROCEDURE — 76856 US EXAM PELVIC COMPLETE: CPT | Mod: 26,,, | Performed by: STUDENT IN AN ORGANIZED HEALTH CARE EDUCATION/TRAINING PROGRAM

## 2024-11-20 PROCEDURE — 83002 ASSAY OF GONADOTROPIN (LH): CPT | Performed by: STUDENT IN AN ORGANIZED HEALTH CARE EDUCATION/TRAINING PROGRAM

## 2024-11-20 PROCEDURE — 82040 ASSAY OF SERUM ALBUMIN: CPT | Performed by: STUDENT IN AN ORGANIZED HEALTH CARE EDUCATION/TRAINING PROGRAM

## 2024-11-20 PROCEDURE — 76830 TRANSVAGINAL US NON-OB: CPT | Mod: 26,,, | Performed by: STUDENT IN AN ORGANIZED HEALTH CARE EDUCATION/TRAINING PROGRAM

## 2024-11-20 PROCEDURE — 83525 ASSAY OF INSULIN: CPT | Performed by: STUDENT IN AN ORGANIZED HEALTH CARE EDUCATION/TRAINING PROGRAM

## 2024-11-20 PROCEDURE — 76856 US EXAM PELVIC COMPLETE: CPT | Mod: TC,PO

## 2024-11-20 PROCEDURE — 36415 COLL VENOUS BLD VENIPUNCTURE: CPT | Performed by: STUDENT IN AN ORGANIZED HEALTH CARE EDUCATION/TRAINING PROGRAM

## 2024-11-20 PROCEDURE — 82670 ASSAY OF TOTAL ESTRADIOL: CPT | Performed by: STUDENT IN AN ORGANIZED HEALTH CARE EDUCATION/TRAINING PROGRAM

## 2024-11-20 PROCEDURE — 99999 PR PBB SHADOW E&M-EST. PATIENT-LVL III: CPT | Mod: PBBFAC,,, | Performed by: STUDENT IN AN ORGANIZED HEALTH CARE EDUCATION/TRAINING PROGRAM

## 2024-11-20 RX ORDER — LACTIC ACID, L-, CITRIC ACID MONOHYDRATE, AND POTASSIUM BITARTRATE 90; 50; 20 MG/5G; MG/5G; MG/5G
1 GEL VAGINAL
Qty: 120 G | Refills: 11 | Status: SHIPPED | OUTPATIENT
Start: 2024-11-20

## 2024-11-25 LAB — 17OHP SERPL-MCNC: 202 NG/DL (ref 35–413)

## 2024-11-27 LAB
ALBUMIN SERPL-MCNC: 4.4 G/DL (ref 3.6–5.1)
SHBG SERPL-SCNC: 32 NMOL/L (ref 17–124)
TESTOST FREE SERPL-MCNC: 4.1 PG/ML (ref 0.2–5)
TESTOST SERPL-MCNC: 34 NG/DL (ref 2–45)
TESTOSTERONE.FREE+WB SERPL-MCNC: 8.2 NG/DL (ref 0.5–8.5)

## 2024-12-09 ENCOUNTER — TELEPHONE (OUTPATIENT)
Dept: GASTROENTEROLOGY | Facility: CLINIC | Age: 29
End: 2024-12-09
Payer: COMMERCIAL

## 2024-12-09 NOTE — TELEPHONE ENCOUNTER
----- Message from Natacha sent at 12/9/2024 11:31 AM CST -----  Regarding: bleeding with clots  Contact: pt  Type:  Needs Medical Advice    Who Called: pt    Symptoms (please be specific): bleeding with clots     Best Call Back Number: [phone]    Additional Information: pt is having a lot of bleeding with clots and is requesting a return call.

## 2024-12-09 NOTE — TELEPHONE ENCOUNTER
Spoke with pt. Pt states she is having a lot of rectal bleeding with clots after having BMs. Offered to move up procedure to this week. Pt took sooner procedure date & will notify clinic if she is unable to have a ride.   Prep instructions sent to pt. Pt verbalized understanding to all .

## 2024-12-11 ENCOUNTER — HOSPITAL ENCOUNTER (OUTPATIENT)
Facility: HOSPITAL | Age: 29
Discharge: HOME | End: 2024-12-11
Attending: STUDENT IN AN ORGANIZED HEALTH CARE EDUCATION/TRAINING PROGRAM | Admitting: STUDENT IN AN ORGANIZED HEALTH CARE EDUCATION/TRAINING PROGRAM
Payer: COMMERCIAL

## 2024-12-11 ENCOUNTER — ANESTHESIA EVENT (OUTPATIENT)
Dept: ENDOSCOPY | Facility: HOSPITAL | Age: 29
End: 2024-12-11
Payer: COMMERCIAL

## 2024-12-11 ENCOUNTER — ANESTHESIA (OUTPATIENT)
Dept: ENDOSCOPY | Facility: HOSPITAL | Age: 29
End: 2024-12-11
Payer: COMMERCIAL

## 2024-12-11 VITALS
TEMPERATURE: 98 F | DIASTOLIC BLOOD PRESSURE: 65 MMHG | WEIGHT: 224 LBS | HEART RATE: 68 BPM | SYSTOLIC BLOOD PRESSURE: 113 MMHG | BODY MASS INDEX: 37.32 KG/M2 | OXYGEN SATURATION: 99 % | HEIGHT: 65 IN | RESPIRATION RATE: 14 BRPM

## 2024-12-11 DIAGNOSIS — K92.1 HEMATOCHEZIA: Primary | ICD-10-CM

## 2024-12-11 LAB
B-HCG UR QL: NEGATIVE
CTP QC/QA: YES

## 2024-12-11 PROCEDURE — 81025 URINE PREGNANCY TEST: CPT | Mod: PO | Performed by: STUDENT IN AN ORGANIZED HEALTH CARE EDUCATION/TRAINING PROGRAM

## 2024-12-11 PROCEDURE — 45378 DIAGNOSTIC COLONOSCOPY: CPT | Mod: ,,, | Performed by: STUDENT IN AN ORGANIZED HEALTH CARE EDUCATION/TRAINING PROGRAM

## 2024-12-11 PROCEDURE — 37000008 HC ANESTHESIA 1ST 15 MINUTES: Mod: PO | Performed by: STUDENT IN AN ORGANIZED HEALTH CARE EDUCATION/TRAINING PROGRAM

## 2024-12-11 PROCEDURE — 37000009 HC ANESTHESIA EA ADD 15 MINS: Mod: PO | Performed by: STUDENT IN AN ORGANIZED HEALTH CARE EDUCATION/TRAINING PROGRAM

## 2024-12-11 PROCEDURE — 25000003 PHARM REV CODE 250: Mod: PO | Performed by: STUDENT IN AN ORGANIZED HEALTH CARE EDUCATION/TRAINING PROGRAM

## 2024-12-11 PROCEDURE — 45378 DIAGNOSTIC COLONOSCOPY: CPT | Mod: PO | Performed by: STUDENT IN AN ORGANIZED HEALTH CARE EDUCATION/TRAINING PROGRAM

## 2024-12-11 PROCEDURE — 63600175 PHARM REV CODE 636 W HCPCS: Mod: PO | Performed by: STUDENT IN AN ORGANIZED HEALTH CARE EDUCATION/TRAINING PROGRAM

## 2024-12-11 RX ORDER — SODIUM CHLORIDE, SODIUM LACTATE, POTASSIUM CHLORIDE, CALCIUM CHLORIDE 600; 310; 30; 20 MG/100ML; MG/100ML; MG/100ML; MG/100ML
INJECTION, SOLUTION INTRAVENOUS CONTINUOUS
Status: DISCONTINUED | OUTPATIENT
Start: 2024-12-11 | End: 2024-12-11 | Stop reason: HOSPADM

## 2024-12-11 RX ORDER — PROPOFOL 10 MG/ML
VIAL (ML) INTRAVENOUS
Status: DISCONTINUED | OUTPATIENT
Start: 2024-12-11 | End: 2024-12-11

## 2024-12-11 RX ORDER — SODIUM CHLORIDE 0.9 % (FLUSH) 0.9 %
10 SYRINGE (ML) INJECTION
Status: DISCONTINUED | OUTPATIENT
Start: 2024-12-11 | End: 2024-12-11 | Stop reason: HOSPADM

## 2024-12-11 RX ORDER — LIDOCAINE HYDROCHLORIDE 20 MG/ML
INJECTION INTRAVENOUS
Status: DISCONTINUED | OUTPATIENT
Start: 2024-12-11 | End: 2024-12-11

## 2024-12-11 RX ADMIN — PROPOFOL 100 MG: 10 INJECTION, EMULSION INTRAVENOUS at 10:12

## 2024-12-11 RX ADMIN — PROPOFOL 20 MG: 10 INJECTION, EMULSION INTRAVENOUS at 11:12

## 2024-12-11 RX ADMIN — SODIUM CHLORIDE: 9 INJECTION, SOLUTION INTRAVENOUS at 10:12

## 2024-12-11 RX ADMIN — PROPOFOL 50 MG: 10 INJECTION, EMULSION INTRAVENOUS at 11:12

## 2024-12-11 RX ADMIN — PROPOFOL 40 MG: 10 INJECTION, EMULSION INTRAVENOUS at 11:12

## 2024-12-11 RX ADMIN — LIDOCAINE HYDROCHLORIDE 100 MG: 20 INJECTION INTRAVENOUS at 10:12

## 2024-12-11 NOTE — ANESTHESIA POSTPROCEDURE EVALUATION
Anesthesia Post Evaluation    Patient: Guilherme Davenport    Procedure(s) Performed: Procedure(s) (LRB):  COLONOSCOPY (N/A)    Final Anesthesia Type: general      Patient location during evaluation: PACU  Patient participation: Yes- Able to Participate  Level of consciousness: awake and alert and oriented  Post-procedure vital signs: reviewed and stable  Pain management: adequate  Airway patency: patent    PONV status at discharge: No PONV  Anesthetic complications: no      Cardiovascular status: blood pressure returned to baseline and stable  Respiratory status: unassisted and spontaneous ventilation  Hydration status: euvolemic  Follow-up not needed.              Vitals Value Taken Time   /65 12/11/24 1130   Temp 36.4 °C (97.6 °F) 12/11/24 1111   Pulse 68 12/11/24 1130   Resp 14 12/11/24 1130   SpO2 99 % 12/11/24 1130         Event Time   Out of Recovery 11:46:39         Pain/Lotus Score: Lotus Score: 10 (12/11/2024 11:11 AM)

## 2024-12-11 NOTE — ANESTHESIA PREPROCEDURE EVALUATION
12/11/2024  Guilherme Davenport is a 29 y.o., female.      Pre-op Assessment    I have reviewed the Patient Summary Reports.     I have reviewed the Nursing Notes. I have reviewed the NPO Status.   I have reviewed the Medications.     Review of Systems  Anesthesia Hx:  No problems with previous Anesthesia                Social:  Non-Smoker       Hematology/Oncology:       -- Anemia:                                  Cardiovascular:                hyperlipidemia                               Pulmonary:  Pulmonary Normal                       Renal/:  Renal/ Normal                 Hepatic/GI:      Liver Disease,               Neurological:  Neurology Normal                                      Endocrine:   Hypothyroidism  Endometriosis        Obesity / BMI > 30  Psych:  Psychiatric History  depression              Physical Exam  General: Well nourished, Cooperative, Alert and Oriented    Airway:  Mallampati: II   Mouth Opening: Normal  TM Distance: Normal  Neck ROM: Normal ROM    Anesthesia Plan  Type of Anesthesia, risks & benefits discussed:    Anesthesia Type: Gen ETT, Gen Supraglottic Airway, Gen Natural Airway, MAC  Intra-op Monitoring Plan: Standard ASA Monitors  Post Op Pain Control Plan: multimodal analgesia  Induction:  IV  Airway Plan: Direct, Video and Fiberoptic, Post-Induction  Informed Consent: Informed consent signed with the Patient and all parties understand the risks and agree with anesthesia plan.  All questions answered.   ASA Score: 2    Ready For Surgery From Anesthesia Perspective.   .

## 2024-12-11 NOTE — [employer]ATION PATIENT INSTRUCTIONS
Discharge Summary/Instructions after an Endoscopic Procedure  Patient Name: Guilherme Davenport  Patient MRN: [id number]  Patient YOB: 1995 Wednesday, December 11, 2024  Alexi Siegel DO  Dear patient,  As a result of recent federal legislation (The Federal Cures Act), you may   receive lab or pathology results from your procedure in your [facility]   account before your physician is able to contact you. Your physician or   their representative will relay the results to you with their   recommendations at their soonest availability.  Thank you,  RESTRICTIONS:  During your procedure today, you received medications for sedation.  These   medications may affect your judgment, balance and coordination.  Therefore,   for 24 hours, you have the following restrictions:   - DO NOT drive a car, operate machinery, make legal/financial decisions,   sign important papers or drink alcohol.    ACTIVITY:  Today: no heavy lifting, straining or running due to procedural   sedation/anesthesia.  The following day: return to full activity including work.  DIET:  Eat and drink normally unless instructed otherwise.     TREATMENT FOR COMMON SIDE EFFECTS:  - Mild abdominal pain, nausea, belching, bloating or excessive gas:  rest,   eat lightly and use a heating pad.  - Sore Throat: treat with throat lozenges and/or gargle with warm salt   water.  - Because air was used during the procedure, expelling large amounts of air   from your rectum or belching is normal.  - If a bowel prep was taken, you may not have a bowel movement for 1-3 days.    This is normal.  SYMPTOMS TO WATCH FOR AND REPORT TO YOUR PHYSICIAN:  1. Abdominal pain or bloating, other than gas cramps.  2. Chest pain.  3. Back pain.  4. Signs of infection such as: chills or fever occurring within 24 hours   after the procedure.  5. Rectal bleeding, which would show as bright red, maroon, or black stools.   (A tablespoon of blood from the rectum is not serious,  especially if   hemorrhoids are present.)  6. Vomiting.  7. Weakness or dizziness.  GO DIRECTLY TO THE NEAREST EMERGENCY ROOM IF YOU HAVE ANY OF THE FOLLOWING:      Difficulty breathing              Chills and/or fever over 101 F   Persistent vomiting and/or vomiting blood   Severe abdominal pain   Severe chest pain   Black, tarry stools   Bleeding- more than one tablespoon   Any other symptom or condition that you feel may need urgent attention  Your doctor recommends these additional instructions:  If any biopsies were taken, your doctors clinic will contact you in 1 to 2   weeks with any results.  You have a contact number available for emergencies.  The signs and symptoms   of potential delayed complications were discussed with you.  You may return   to normal activities tomorrow.  Written discharge instructions were   provided to you.   Eat a high fiber diet.   Continue your present medications.   Your physician has recommended a repeat colonoscopy at age 47 (please   contact the clinic prior to your 47th birthday to schedule) for screening   purposes.   Return to your nurse practitioner as previously scheduled.   You are being discharged to home.  For questions, problems or results please call your physician - Alexi Siegel DO at Work:  [phone].  EMERGENCY PHONE NUMBER: [phone], LAB RESULTS: [phone]  IF A COMPLICATION OR EMERGENCY SITUATION ARISES AND YOU ARE UNABLE TO REACH   YOUR PHYSICIAN - GO DIRECTLY TO THE EMERGENCY ROOM.  ___________________________________________  Nurse Signature  ___________________________________________  Patient/Designated Responsible Party Signature  Alexi Siegel DO  12/11/2024 11:15:25 AM  This report has been verified and signed electronically.  Dear patient,  As a result of recent federal legislation (The Federal Cures Act), you may   receive lab or pathology results from your procedure in your [facility]   account before your physician is able  to contact you. Your physician or   their representative will relay the results to you with their   recommendations at their soonest availability.  Thank you.  PROVATION

## 2024-12-11 NOTE — TRANSFER OF CARE
"Anesthesia Transfer of Care Note    Patient: Guilherme Davenport    Procedure(s) Performed: Procedure(s) (LRB):  COLONOSCOPY (N/A)    Patient location: PACU    Anesthesia Type: general    Transport from OR: Transported from OR on room air with adequate spontaneous ventilation    Post pain: adequate analgesia    Post assessment: no apparent anesthetic complications and tolerated procedure well    Post vital signs: stable    Level of consciousness: sedated and responds to stimulation    Nausea/Vomiting: no nausea/vomiting    Complications: none    Transfer of care protocol was followed      Last vitals: Visit Vitals  /68 (BP Location: Right arm, Patient Position: Lying)   Pulse 73   Temp 36.6 °C (97.8 °F) (Skin)   Ht 5' 5" (1.651 m)   Wt 101.6 kg (224 lb)   LMP 11/30/2024   SpO2 97%   Breastfeeding No   BMI 37.28 kg/m²     "

## 2024-12-11 NOTE — H&P
History & Physical - Short Stay  Gastroenterology      SUBJECTIVE:     Procedure: Colonoscopy    Chief Complaint/Indication for Procedure: Hematochezia    PTA Medications   Medication Sig    fluticasone propionate (FLONASE) 50 mcg/actuation nasal spray 1 spray (50 mcg total) by Each Nostril route once daily.    lactic acid-citric-potassium (PHEXXI) 1.8-1-0.4 % Gel Place 1 applicator vaginally as needed.       Review of patient's allergies indicates:  No Known Allergies     Past Medical History:   Diagnosis Date    Acquired hypothyroidism 09/13/2019    New diagnosis.  Duration is unknown.  Patient having symptoms of fatigue hair loss or weight gain.  Lab Results Component Value Date  TSH 4.364 (H) 08/07/2019  FREET4 0.91 08/07/2019     Encounter for long-term (current) use of medications 09/13/2019 09/13/2019  Patient is on CHRONIC long-term drug therapy for managed conditions. See medication list. Reports compliance.  No side effects reported.  Routine lab work is being monitored.  Patient does need refills today.   Lab Results Component Value Date  WBC 5.76 08/07/2019  HGB 11.2 (L) 08/07/2019  HCT 36.3 (L) 08/07/2019  MCV 78 (L) 08/07/2019   (H) 08/07/2019   CMP Sodium Date Value Re    Endometriosis, unspecified     Fatty liver 09/13/2019    EXAMINATION: US ABDOMEN LIMITED  CLINICAL HISTORY: Nausea with vomiting, unspecified  COMPARISON: None  FINDINGS: Limited right upper quadrant ultrasound performed.  The liver measures 15.4 cm in length and demonstrates increased echogenicity consistent with hepatic steatosis.  Portal vein is patent.  Common bile duct is within normal limits at 3 mm..  No gallstones, gallbladder wall thickening, o    Hyperlipidemia 09/13/2019    Chronic.  Uncontrolled.  No results found for: CHOL Lab Results Component Value Date  HDL 71 11/08/2018  Lab Results Component Value Date  LDLCALC 116 11/08/2018  Lab Results Component Value Date  TRIG 290 11/08/2018  No results found for:  CHOLHDL      Microcytic anemia 09/13/2019    Chronic. Untreated.  Lab Results Component Value Date  WBC 5.76 08/07/2019  HGB 11.2 (L) 08/07/2019  HCT 36.3 (L) 08/07/2019  MCV 78 (L) 08/07/2019   (H) 08/07/2019       Past Surgical History:   Procedure Laterality Date    SURGICAL REMOVAL OF ENDOMETRIOSIS Bilateral 2017    Dr. Kvng Koenig    TONSILLECTOMY  2002     Family History   Problem Relation Name Age of Onset    Stroke Paternal Grandmother Tisha Brar     Uterine cancer Maternal Grandmother Tanna Welch     Arthritis Maternal Grandmother Tannatonia Garrett     Diabetes Maternal Grandmother Tanna Garrett     Depression Father Guilherme Brar     Hearing loss Father Guilherme Brar     Diabetes Mother Kristyn Brar     Hashimoto's thyroiditis Mother Kristyn Brar     Gallbladder disease Sister      Breast cancer Other Maternal Great Aunt     Ovarian cancer Other Maternal Great Aunt     Colon cancer Neg Hx      Crohn's disease Neg Hx      Esophageal cancer Neg Hx      Stomach cancer Neg Hx      Ulcerative colitis Neg Hx      Celiac disease Neg Hx       Social History     Tobacco Use    Smoking status: Former     Types: Cigarettes    Smokeless tobacco: Never   Substance Use Topics    Alcohol use: Yes     Comment: rarely    Drug use: Yes     Frequency: 7.0 times per week     Types: Marijuana     Comment: daily         OBJECTIVE:     Vital Signs (Most Recent)  Temp: 97.8 °F (36.6 °C) (12/11/24 1018)  Pulse: 73 (12/11/24 1016)  BP: 114/68 (12/11/24 1016)  SpO2: 97 % (12/11/24 1016)    Physical Exam:                                                       GENERAL:  Comfortable, in no acute distress.                                 HEENT EXAM:  Nonicteric.  No adenopathy.  Oropharynx is clear.               NECK:  Supple.                                                               LUNGS:  Clear.                                                               CARDIAC:  Regular rate and rhythm.  S1, S2.  No murmur.                       ABDOMEN:  Soft, positive bowel sounds, nontender.  No hepatosplenomegaly or masses.  No rebound or guarding.                                             EXTREMITIES:  No edema.     MENTAL STATUS:  Normal, alert and oriented.      ASSESSMENT/PLAN:     Assessment: Hematochezia    Plan: Colonoscopy    Anesthesia Plan: General    ASA Grade: ASA 2 - Patient with mild systemic disease with no functional limitations    MALLAMPATI SCORE:  I (soft palate, uvula, fauces, and tonsillar pillars visible)

## 2025-01-07 ENCOUNTER — PATIENT MESSAGE (OUTPATIENT)
Dept: OBSTETRICS AND GYNECOLOGY | Facility: CLINIC | Age: 30
End: 2025-01-07

## 2025-01-07 ENCOUNTER — OFFICE VISIT (OUTPATIENT)
Dept: OBSTETRICS AND GYNECOLOGY | Facility: CLINIC | Age: 30
End: 2025-01-07
Payer: COMMERCIAL

## 2025-01-07 DIAGNOSIS — F32.81 PMDD (PREMENSTRUAL DYSPHORIC DISORDER): ICD-10-CM

## 2025-01-07 DIAGNOSIS — L68.0 HIRSUTISM: ICD-10-CM

## 2025-01-07 DIAGNOSIS — Z83.2 FAMILY HISTORY OF CLOTTING DISORDER: ICD-10-CM

## 2025-01-07 DIAGNOSIS — N83.202 LEFT OVARIAN CYST: Primary | ICD-10-CM

## 2025-01-07 RX ORDER — METFORMIN HYDROCHLORIDE 500 MG/1
500 TABLET ORAL 2 TIMES DAILY WITH MEALS
Qty: 180 TABLET | Refills: 3 | Status: SHIPPED | OUTPATIENT
Start: 2025-01-07 | End: 2026-01-07

## 2025-01-07 NOTE — PROGRESS NOTES
The patient location is: car ([place])  The chief complaint leading to consultation is: f/u    Visit type: audiovisual    Face to Face time with patient: 12 min  18 minutes of total time spent on the encounter, which includes face to face time and non-face to face time preparing to see the patient (eg, review of tests), Obtaining and/or reviewing separately obtained history, Documenting clinical information in the electronic or other health record, Independently interpreting results (not separately reported) and communicating results to the patient/family/caregiver, or Care coordination (not separately reported).         Each patient to whom he or she provides medical services by telemedicine is:  (1) informed of the relationship between the physician and patient and the respective role of any other health care provider with respect to management of the patient; and (2) notified that he or she may decline to receive medical services by telemedicine and may withdraw from such care at any time.    Notes:   Interval HPI today 01/07/2025:   Guilherme is seen today virtually for f/u and results  PMDD   - read that pepcid during luteal phase can be beneficial - found that it helped with last cycle  Psb PCOS/hirsutism   - normal US, normal androgen panel (though +clinical androgen excess), +LH/FSH reversal though non-diagnostic   L sided pelvic pain  - unchanged     Initial HPI 11/20/24:   Pt is a 29 y.o.  female who presents for routine annual exam.  Also wanting to address the following  Hormonal mood changes. 2 weeks out of the month she is depressed, extremely unmotivated. Improves when she starts her period.   Worsening facial and chest hair, especially over past 1 year. Gained back 60# previously lost despite ongoing diet/exercise efforts.   Persistent LLQ/L sided pelvic pain. CTAP was normal. She is est with GI. Known hx endo.   Mom recently dx protein s deficiency and factor v mutation. Pt unsure if mom had VTE or is on  "blood thinners.  Today (2025) pt corrected me it is actually paternal aunt. She will try and get the details.     OB History    Para Term  AB Living   0 0 0 0 0 0   SAB IAB Ectopic Multiple Live Births   0 0 0 0 0        PE:   APPEARANCE: Well nourished, well developed female in no acute distress.  RESPIRATORY: normal respiratory effort  NEURO: alert and oriented  PSYCH: normal mood and affect      Diagnosis:  1. Left ovarian cyst    2. Family history of clotting disorder    3. Hirsutism    4. PMDD (premenstrual dysphoric disorder)      - COCs could help with PMDD, PCOS/hirsutism, as well as cyst prevention and endo management. She will verify her family history. We will check labs.     - notes she is "not a medicine person" though is amenable to metformin. She does have GI issues so will wait to see her GI prior to initiating. Noted that metformin GI side effects are self-limited and usually resolve after 1-2 weeks.    - repeat pelvic US next month    Orders Placed This Encounter    US Pelvis Comp with Transvag NON-OB (xpd    Protein S Activity    Factor 5 leiden    metFORMIN (GLUCOPHAGE) 500 MG tablet         RTC 4-6 months    "

## 2025-01-08 ENCOUNTER — LAB VISIT (OUTPATIENT)
Dept: LAB | Facility: HOSPITAL | Age: 30
End: 2025-01-08
Attending: STUDENT IN AN ORGANIZED HEALTH CARE EDUCATION/TRAINING PROGRAM
Payer: COMMERCIAL

## 2025-01-08 DIAGNOSIS — Z83.2 FAMILY HISTORY OF CLOTTING DISORDER: ICD-10-CM

## 2025-01-08 PROCEDURE — 85306 CLOT INHIBIT PROT S FREE: CPT | Performed by: STUDENT IN AN ORGANIZED HEALTH CARE EDUCATION/TRAINING PROGRAM

## 2025-01-08 PROCEDURE — 36415 COLL VENOUS BLD VENIPUNCTURE: CPT | Mod: PO | Performed by: STUDENT IN AN ORGANIZED HEALTH CARE EDUCATION/TRAINING PROGRAM

## 2025-01-08 PROCEDURE — 81241 F5 GENE: CPT | Performed by: STUDENT IN AN ORGANIZED HEALTH CARE EDUCATION/TRAINING PROGRAM

## 2025-01-10 LAB — PROT S ACT/NOR PPP: 127 % (ref 50–150)

## 2025-01-13 ENCOUNTER — PATIENT MESSAGE (OUTPATIENT)
Dept: OBSTETRICS AND GYNECOLOGY | Facility: CLINIC | Age: 30
End: 2025-01-13
Payer: COMMERCIAL

## 2025-01-13 LAB — F5 P.R506Q BLD/T QL: NEGATIVE

## 2025-01-14 ENCOUNTER — LAB VISIT (OUTPATIENT)
Dept: LAB | Facility: HOSPITAL | Age: 30
End: 2025-01-14
Attending: INTERNAL MEDICINE
Payer: COMMERCIAL

## 2025-01-14 ENCOUNTER — OFFICE VISIT (OUTPATIENT)
Dept: HEMATOLOGY/ONCOLOGY | Facility: CLINIC | Age: 30
End: 2025-01-14
Payer: COMMERCIAL

## 2025-01-14 VITALS
OXYGEN SATURATION: 98 % | SYSTOLIC BLOOD PRESSURE: 123 MMHG | HEART RATE: 75 BPM | TEMPERATURE: 97 F | RESPIRATION RATE: 18 BRPM | WEIGHT: 229.5 LBS | DIASTOLIC BLOOD PRESSURE: 73 MMHG | HEIGHT: 65 IN | BODY MASS INDEX: 38.24 KG/M2

## 2025-01-14 DIAGNOSIS — Z83.2 FAMILY HISTORY OF FACTOR V DEFICIENCY: ICD-10-CM

## 2025-01-14 DIAGNOSIS — Z83.2 FAMILY HISTORY OF PROTEIN S DEFICIENCY: ICD-10-CM

## 2025-01-14 DIAGNOSIS — E66.01 SEVERE OBESITY (BMI 35.0-39.9) WITH COMORBIDITY: ICD-10-CM

## 2025-01-14 DIAGNOSIS — D68.59 PROTEIN S DEFICIENCY: ICD-10-CM

## 2025-01-14 DIAGNOSIS — Z83.2 FAMILY HISTORY OF CLOTTING DISORDER: ICD-10-CM

## 2025-01-14 DIAGNOSIS — D68.59 PROTEIN S DEFICIENCY: Primary | ICD-10-CM

## 2025-01-14 PROCEDURE — 85306 CLOT INHIBIT PROT S FREE: CPT | Performed by: INTERNAL MEDICINE

## 2025-01-14 PROCEDURE — 3008F BODY MASS INDEX DOCD: CPT | Mod: CPTII,S$GLB,, | Performed by: INTERNAL MEDICINE

## 2025-01-14 PROCEDURE — 3074F SYST BP LT 130 MM HG: CPT | Mod: CPTII,S$GLB,, | Performed by: INTERNAL MEDICINE

## 2025-01-14 PROCEDURE — 99999 PR PBB SHADOW E&M-EST. PATIENT-LVL III: CPT | Mod: PBBFAC,,, | Performed by: INTERNAL MEDICINE

## 2025-01-14 PROCEDURE — 99204 OFFICE O/P NEW MOD 45 MIN: CPT | Mod: S$GLB,,, | Performed by: INTERNAL MEDICINE

## 2025-01-14 PROCEDURE — 3078F DIAST BP <80 MM HG: CPT | Mod: CPTII,S$GLB,, | Performed by: INTERNAL MEDICINE

## 2025-01-14 PROCEDURE — 36415 COLL VENOUS BLD VENIPUNCTURE: CPT | Performed by: INTERNAL MEDICINE

## 2025-01-14 NOTE — PROGRESS NOTES
Subjective:       Patient ID: Guilherme Davenport is a 29 y.o. female.    Chief Complaint:  Family history of protein S deficiency  Z83.2] Family hist    HPI  Patient is sent by her key gyn for evaluation due to paternal aunt having protein-s deficiency and factor 5 Leiden mutation and being on anticoagulation  Patient has history of premenopausal depression disorder for which this a plan for starting BCP prior to which this testing was recommended  No other family members with a history of clotting/bleeding  Patient herself has no factors that are concerning and no clotting history  Review of Systems    Patient denies issues related to appetite or recent weight change.  Feels well overall.  Denies issues with generalized weakness .  Denies fatigue over above what is normally experienced with day-to-day activities  Denies fever, chills, rigors  Denies issues with ambulation  Denies generalized swelling or new lumps and bumps felt in any part  of body  Denies visual or hearing loss  Denies issues with congestion, sinus issues, cough, sputum production runny nose or itching eyes  Denies chest pain or palpitations, or passing out  Denies abdominal pain, reflux symptoms, nausea vomiting loose stools or constipation  Denies seizure activity or focal weaknesses or symptoms related to TIA, no head aches or blurred vision reported  Denies issues with skin rash or bruising  Denies issues with swelling of feet, tingling or numbness   No issues with sleep,   No recent foreign travel   Good family support reported   Premenstrual depression reported        Past Medical History:   Diagnosis Date    Acquired hypothyroidism 09/13/2019    New diagnosis.  Duration is unknown.  Patient having symptoms of fatigue hair loss or weight gain.  Lab Results Component Value Date  TSH 4.364 (H) 08/07/2019  FREET4 0.91 08/07/2019     Encounter for long-term (current) use of medications 09/13/2019 09/13/2019  Patient is on CHRONIC  long-term drug therapy for managed conditions. See medication list. Reports compliance.  No side effects reported.  Routine lab work is being monitored.  Patient does need refills today.   Lab Results Component Value Date  WBC 5.76 08/07/2019  HGB 11.2 (L) 08/07/2019  HCT 36.3 (L) 08/07/2019  MCV 78 (L) 08/07/2019   (H) 08/07/2019   CMP Sodium Date Value Re    Endometriosis, unspecified     Fatty liver 09/13/2019    EXAMINATION: US ABDOMEN LIMITED  CLINICAL HISTORY: Nausea with vomiting, unspecified  COMPARISON: None  FINDINGS: Limited right upper quadrant ultrasound performed.  The liver measures 15.4 cm in length and demonstrates increased echogenicity consistent with hepatic steatosis.  Portal vein is patent.  Common bile duct is within normal limits at 3 mm..  No gallstones, gallbladder wall thickening, o    Hyperlipidemia 09/13/2019    Chronic.  Uncontrolled.  No results found for: CHOL Lab Results Component Value Date  HDL 71 11/08/2018  Lab Results Component Value Date  LDLCALC 116 11/08/2018  Lab Results Component Value Date  TRIG 290 11/08/2018  No results found for: CHOLHDL      Microcytic anemia 09/13/2019    Chronic. Untreated.  Lab Results Component Value Date  WBC 5.76 08/07/2019  HGB 11.2 (L) 08/07/2019  HCT 36.3 (L) 08/07/2019  MCV 78 (L) 08/07/2019   (H) 08/07/2019       Past Surgical History:   Procedure Laterality Date    COLONOSCOPY N/A 12/11/2024    Procedure: COLONOSCOPY;  Surgeon: Alexi Siegel DO;  Location: [facility] ENDO;  Service: Endoscopy;  Laterality: N/A;    ESOPHAGOGASTRODUODENOSCOPY N/A 1/7/2025    Procedure: EGD (ESOPHAGOGASTRODUODENOSCOPY);  Surgeon: Alexi Siegel DO;  Location: [facility] ENDO;  Service: Endoscopy;  Laterality: N/A;    SURGICAL REMOVAL OF ENDOMETRIOSIS Bilateral 2017    Dr. Kvng Koenig    TONSILLECTOMY  2002     Family History   Problem Relation Name Age of Onset    Stroke Paternal Grandmother Tisha Brar     Uterine cancer Maternal  Grandmother Tanna Garrett     Arthritis Maternal Grandmother Tanna Garrett     Diabetes Maternal Grandmother Tanna Garrett     Depression Father Guilherme Brar     Hearing loss Father Guilherme Brar     Diabetes Mother Kristyn Brar     Hashimoto's thyroiditis Mother Kristyn Brar     Gallbladder disease Sister      Breast cancer Other Maternal Great Aunt     Ovarian cancer Other Maternal Great Aunt     Colon cancer Neg Hx      Crohn's disease Neg Hx      Esophageal cancer Neg Hx      Stomach cancer Neg Hx      Ulcerative colitis Neg Hx      Celiac disease Neg Hx        Social History     Socioeconomic History    Marital status:    Tobacco Use    Smoking status: Former     Types: Cigarettes    Smokeless tobacco: Never   Substance and Sexual Activity    Alcohol use: Yes     Comment: rarely    Drug use: Yes     Frequency: 7.0 times per week     Types: Marijuana     Comment: daily    Sexual activity: Yes     Partners: Male     Birth control/protection: Condom     Social Drivers of Health     Financial Resource Strain: Low Risk  (10/15/2024)    Overall Financial Resource Strain (CARDIA)     Difficulty of Paying Living Expenses: Not very hard   Food Insecurity: No Food Insecurity (10/15/2024)    Hunger Vital Sign     Worried About Running Out of Food in the Last Year: Never true     Ran Out of Food in the Last Year: Never true   Transportation Needs: No Transportation Needs (10/11/2019)    PRAPARE - Transportation     Lack of Transportation (Medical): No     Lack of Transportation (Non-Medical): No   Physical Activity: Sufficiently Active (10/15/2024)    Exercise Vital Sign     Days of Exercise per Week: 3 days     Minutes of Exercise per Session: 60 min   Stress: Stress Concern Present (10/15/2024)    [place] [employer] - Occupational Stress Questionnaire     Feeling of Stress : Rather much   Housing Stability: Unknown (10/15/2024)    Housing Stability Vital Sign     Unable to Pay for Housing in the  Last Year: No     Review of patient's allergies indicates:  No Known Allergies    Current Outpatient Medications:     famotidine (PEPCID) 10 MG tablet, Take 10 mg by mouth 2 (two) times daily as needed for Heartburn., Disp: , Rfl:     fluticasone propionate (FLONASE) 50 mcg/actuation nasal spray, 1 spray (50 mcg total) by Each Nostril route once daily. (Patient not taking: Reported on 1/6/2025), Disp: 18.2 mL, Rfl: 0    lactic acid-citric-potassium (PHEXXI) 1.8-1-0.4 % Gel, Place 1 applicator vaginally as needed. (Patient not taking: Reported on 1/6/2025), Disp: 120 g, Rfl: 11    metFORMIN (GLUCOPHAGE) 500 MG tablet, Take 1 tablet (500 mg total) by mouth 2 (two) times daily with meals. Start with 1 tablet daily with breakfast x 14 days, then take as prescribed, Disp: 180 tablet, Rfl: 3    Physical Exam    Wt Readings from Last 3 Encounters:   01/14/25 104.1 kg (229 lb 8 oz)   01/07/25 103.9 kg (229 lb 0.9 oz)   12/11/24 101.6 kg (224 lb)     Temp Readings from Last 3 Encounters:   01/14/25 97.2 °F (36.2 °C) (Temporal)   01/07/25 98.1 °F (36.7 °C) (Temporal)   12/11/24 97.6 °F (36.4 °C)     BP Readings from Last 3 Encounters:   01/14/25 123/73   01/07/25 (!) 101/45   12/11/24 113/65     Pulse Readings from Last 3 Encounters:   01/14/25 75   01/07/25 63   12/11/24 68      VITAL SIGNS:  as above   GENERAL: appears well-built, well-nourished.obese  No anxiety, no agitation, and in no distress.  Patient is awake, alert, oriented and cooperative.  HEENT:  Showed no congestion. Trachea is central no obvious icterus or pallor noted no hoarseness. no obvious JVD   NECK:  Supple.  No JVD. No obvious cervical submental or supraclavicular adenopathy.  RS:the visualized portion of  Chest expands well. chest appears symmetric, no audible wheezes.  No dyspnea recognized  ABDOMEN:  abdomen appears undistended.  EXTREMITIES:  Without edema.  NEUROLOGICAL:  The patient is appropriate, higher functions are normal.  No  obvious  neurological deficits.  normal judgement normal thought content  No confusion, no speech impediment. Cranial nerves are intact and show no deficit. No gross motor deficits noted   SKIN MUSCULOSKELETAL: no joint or skeletal deformity, no clubbing of nails.  No visible rash ecchymosis or petechiae     Lab Results   Component Value Date    WBC 6.97 10/16/2024    HGB 13.9 10/16/2024    HCT 42.7 10/16/2024    MCV 90 10/16/2024     10/16/2024       BMP  Lab Results   Component Value Date     10/16/2024    K 4.3 10/16/2024     10/16/2024    CO2 24 10/16/2024    BUN 10 10/16/2024    CREATININE 0.8 10/16/2024    CALCIUM 9.1 10/16/2024    ANIONGAP 10 10/16/2024    ESTGFRAFRICA >60.0 05/10/2021    EGFRNONAA >60.0 05/10/2021     Lab Results   Component Value Date    IRON 77 05/10/2021    TIBC 404 05/10/2021    FERRITIN 32 05/10/2021         Patient Active Problem List   Diagnosis    Fatty liver    Microcytic anemia    Hyperlipidemia    Encounter for long-term (current) use of medications    Acquired hypothyroidism    Endometriosis    Lack of concentration    Depression    Iron deficiency    Severe obesity (BMI 35.0-39.9) with comorbidity    Marijuana use        Assessment and Plan   Family history of protein S and factor 5 Leiden deficiency  Factor 5 Leiden is negative protein S activity is normal but antigen level not tested will draw today  Discussed with patient all the pros and cons of testing and explained what test results mean.  Will phone review results if all testing is negative patient can carry on with birth control pills for PMDD.  If patient does prove to have a thrombus that will determine need for lifelong anticoagulation regardless of results testing    Severe obesity: BMI with 38 patient is aware and working hard towards weight loss

## 2025-01-16 LAB
PROT S AG ACT/NOR PPP IA: 95 % (ref 60–150)
PROT S FREE AG ACT/NOR PPP IA: 120 % (ref 61–136)

## 2025-01-20 ENCOUNTER — TELEPHONE (OUTPATIENT)
Dept: GASTROENTEROLOGY | Facility: CLINIC | Age: 30
End: 2025-01-20
Payer: COMMERCIAL

## 2025-02-10 ENCOUNTER — HOSPITAL ENCOUNTER (OUTPATIENT)
Dept: RADIOLOGY | Facility: HOSPITAL | Age: 30
Discharge: HOME | End: 2025-02-10
Attending: STUDENT IN AN ORGANIZED HEALTH CARE EDUCATION/TRAINING PROGRAM
Payer: COMMERCIAL

## 2025-02-10 DIAGNOSIS — N83.202 LEFT OVARIAN CYST: ICD-10-CM

## 2025-02-10 PROCEDURE — 76856 US EXAM PELVIC COMPLETE: CPT | Mod: 26,,, | Performed by: RADIOLOGY

## 2025-02-10 PROCEDURE — 76830 TRANSVAGINAL US NON-OB: CPT | Mod: TC,PO

## 2025-02-10 PROCEDURE — 76830 TRANSVAGINAL US NON-OB: CPT | Mod: 26,,, | Performed by: RADIOLOGY

## 2025-03-24 ENCOUNTER — OFFICE VISIT (OUTPATIENT)
Dept: ORTHOPEDICS | Facility: CLINIC | Age: 30
End: 2025-03-24
Payer: COMMERCIAL

## 2025-03-24 ENCOUNTER — HOSPITAL ENCOUNTER (OUTPATIENT)
Dept: RADIOLOGY | Facility: HOSPITAL | Age: 30
Discharge: HOME | End: 2025-03-24
Attending: STUDENT IN AN ORGANIZED HEALTH CARE EDUCATION/TRAINING PROGRAM
Payer: COMMERCIAL

## 2025-03-24 DIAGNOSIS — M25.819 SHOULDER IMPINGEMENT: ICD-10-CM

## 2025-03-24 DIAGNOSIS — G89.29 CHRONIC LEFT SHOULDER PAIN: ICD-10-CM

## 2025-03-24 DIAGNOSIS — M25.512 CHRONIC LEFT SHOULDER PAIN: ICD-10-CM

## 2025-03-24 DIAGNOSIS — M25.512 CHRONIC LEFT SHOULDER PAIN: Primary | ICD-10-CM

## 2025-03-24 DIAGNOSIS — M75.52 SUBACROMIAL BURSITIS OF LEFT SHOULDER JOINT: ICD-10-CM

## 2025-03-24 DIAGNOSIS — G25.89 SCAPULAR DYSKINESIS: Primary | ICD-10-CM

## 2025-03-24 DIAGNOSIS — G89.29 CHRONIC LEFT SHOULDER PAIN: Primary | ICD-10-CM

## 2025-03-24 DIAGNOSIS — M67.912 TENDINOPATHY OF LEFT ROTATOR CUFF: ICD-10-CM

## 2025-03-24 PROCEDURE — 99999 PR PBB SHADOW E&M-EST. PATIENT-LVL III: CPT | Mod: PBBFAC,,, | Performed by: STUDENT IN AN ORGANIZED HEALTH CARE EDUCATION/TRAINING PROGRAM

## 2025-03-24 PROCEDURE — 99204 OFFICE O/P NEW MOD 45 MIN: CPT | Mod: S$GLB,,, | Performed by: STUDENT IN AN ORGANIZED HEALTH CARE EDUCATION/TRAINING PROGRAM

## 2025-03-24 PROCEDURE — 1160F RVW MEDS BY RX/DR IN RCRD: CPT | Mod: CPTII,S$GLB,, | Performed by: STUDENT IN AN ORGANIZED HEALTH CARE EDUCATION/TRAINING PROGRAM

## 2025-03-24 PROCEDURE — 1159F MED LIST DOCD IN RCRD: CPT | Mod: CPTII,S$GLB,, | Performed by: STUDENT IN AN ORGANIZED HEALTH CARE EDUCATION/TRAINING PROGRAM

## 2025-03-24 NOTE — PROGRESS NOTES
Patient ID: Guilherme Davenport  YOB: 1995  MRN: [id number]    Chief Complaint: Pain of the Left Shoulder      Referred By: self    History of Present Illness: Guilherme Davenport is a right-hand dominant 30 y.o. female who presents today with left shoulder pain.  Shoulder pain for the last 2 years but has progressively gotten worse. No injury, ache Is constant but worse with movement. Lifting and reaching behind is painful. Feel like pain is deep in the shoulder joint, mostly towards the back of shoulder. She has not tried anything for the symptoms. She does do little home stretching, but here to get shoulder checked out before it pain gets worse.      Occupation: self      Past Medical History:   Past Medical History:   Diagnosis Date    Acquired hypothyroidism 09/13/2019    New diagnosis.  Duration is unknown.  Patient having symptoms of fatigue hair loss or weight gain.  Lab Results Component Value Date  TSH 4.364 (H) 08/07/2019  FREET4 0.91 08/07/2019     Encounter for long-term (current) use of medications 09/13/2019 09/13/2019  Patient is on CHRONIC long-term drug therapy for managed conditions. See medication list. Reports compliance.  No side effects reported.  Routine lab work is being monitored.  Patient does need refills today.   Lab Results Component Value Date  WBC 5.76 08/07/2019  HGB 11.2 (L) 08/07/2019  HCT 36.3 (L) 08/07/2019  MCV 78 (L) 08/07/2019   (H) 08/07/2019   CMP Sodium Date Value Re    Endometriosis, unspecified     Fatty liver 09/13/2019    EXAMINATION: US ABDOMEN LIMITED  CLINICAL HISTORY: Nausea with vomiting, unspecified  COMPARISON: None  FINDINGS: Limited right upper quadrant ultrasound performed.  The liver measures 15.4 cm in length and demonstrates increased echogenicity consistent with hepatic steatosis.  Portal vein is patent.  Common bile duct is within normal limits at 3 mm..  No gallstones, gallbladder wall thickening, o     Hyperlipidemia 09/13/2019    Chronic.  Uncontrolled.  No results found for: CHOL Lab Results Component Value Date  HDL 71 11/08/2018  Lab Results Component Value Date  LDLCALC 116 11/08/2018  Lab Results Component Value Date  TRIG 290 11/08/2018  No results found for: CHOLHDL      Microcytic anemia 09/13/2019    Chronic. Untreated.  Lab Results Component Value Date  WBC 5.76 08/07/2019  HGB 11.2 (L) 08/07/2019  HCT 36.3 (L) 08/07/2019  MCV 78 (L) 08/07/2019   (H) 08/07/2019       Past Surgical History:   Procedure Laterality Date    COLONOSCOPY N/A 12/11/2024    Procedure: COLONOSCOPY;  Surgeon: Alexi Siegel DO;  Location: [facility] ENDO;  Service: Endoscopy;  Laterality: N/A;    ESOPHAGOGASTRODUODENOSCOPY N/A 1/7/2025    Procedure: EGD (ESOPHAGOGASTRODUODENOSCOPY);  Surgeon: Alexi Siegel DO;  Location[facility] ENDO;  Service: Endoscopy;  Laterality: N/A;    SURGICAL REMOVAL OF ENDOMETRIOSIS Bilateral 2017    Dr. Kvng Koenig    TONSILLECTOMY  2002     Family History   Problem Relation Name Age of Onset    Stroke Paternal Grandmother Tisha Brar     Uterine cancer Maternal Grandmother Tanna Garrett     Arthritis Maternal Grandmother Tanna Garrett     Diabetes Maternal Grandmother Tanna Garrett     Depression Father Guilherme Brar     Hearing loss Father Guilherme Brar     Diabetes Mother Kristyn Brar     Hashimoto's thyroiditis Mother Kristyn Brar     Gallbladder disease Sister      Breast cancer Other Maternal Great Aunt     Ovarian cancer Other Maternal Great Aunt     Colon cancer Neg Hx      Crohn's disease Neg Hx      Esophageal cancer Neg Hx      Stomach cancer Neg Hx      Ulcerative colitis Neg Hx      Celiac disease Neg Hx       Social History[1]  Medication List with Changes/Refills   Current Medications    FAMOTIDINE (PEPCID) 10 MG TABLET    Take 10 mg by mouth 2 (two) times daily as needed for Heartburn.    FLUTICASONE PROPIONATE (FLONASE) 50 MCG/ACTUATION NASAL SPRAY    1 spray (50 mcg total)  by Each Nostril route once daily.    LACTIC ACID-CITRIC-POTASSIUM (PHEXXI) 1.8-1-0.4 % GEL    Place 1 applicator vaginally as needed.    METFORMIN (GLUCOPHAGE) 500 MG TABLET    Take 1 tablet (500 mg total) by mouth 2 (two) times daily with meals. Start with 1 tablet daily with breakfast x 14 days, then take as prescribed     Review of patient's allergies indicates:  No Known Allergies    Physical Exam:   There is no height or weight on file to calculate BMI.    GENERAL: Well appearing, in no acute distress.  HEAD: Normocephalic and atraumatic.  ENT: External ears and nose grossly normal.  EYES: EOMI bilaterally  PULMONARY: Respirations are grossly even and non-labored.  NEURO: Awake, alert, and oriented x 3.  SKIN: No obvious rashes appreciated.  PSYCH: Mood & affect are appropriate.    Detailed MSK exam:     Left shoulder exam:   -ROM: abduction 130, forward flexion 130, external rotation 90, internal rotation 80  -empty can test pain but no weakness, resisted ER negative, belly press negative  -gupta test positive, neers test positive, whipple test positive  -biceps load test negative, yerguson test negative, Cherokee's test negative  -sensation intact, pulses 2+  -TTP: lateral cuff insertion  -scapular dyskinesis    Right shoulder exam:   -ROM: abduction 130, forward flexion 130, external rotation 90, internal rotation 80  -empty can test negative, resisted ER negative, belly press negative  -gupta test negative, neers test negative, whipple test negative  -biceps load test negative, yerguson test negative, Cherokee's test negative  -sensation intact, pulses 2+  -TTP: none      Imaging:  US Pelvis Comp with Transvag NON-OB (xpd  Narrative: EXAM: US PELVIS COMP WITH TRANSVAG NON-OB (XPD)    CLINICAL HISTORY: Left ovarian cyst.    TECHNIQUE: Standard complete pelvic ultrasound with transabdominal and endovaginal ultrasound.    COMPARISON: 11/20/2024 ultrasound    FINDINGS:  The uterus is normal at 2.9 x 3.8 x 7.1  cm.  Endometrial stripe thickness is normal at 4.5 mm.    The right ovary is normal at 2.2 x 2.1 x 3.2 cm.  Normal blood flow.  Several small follicles    The left ovary is normal at 1.7 x 2.3 x 3.7 cm.  Normal blood flow.  Several small follicles.  The septated cyst is no longer seen.    No free fluid.  Impression:  Negative complete pelvic ultrasound as detailed above.    Finalized on: 2/10/2025 8:50 AM By:  Stan Christiansen MD  [facility]# [id number]      2025-02-10 08:52:[id number]     [facility]        Relevant imaging results were reviewed and interpreted by me and per my read shows no acute abnormalities.  This was discussed with the patient and / or family today.     Assessment:  Guilherme Davenport is a 30 y.o. female presenting with chronic left shoulder pain.   History, physical and radiographs are consistent with a likely diagnosis of scapular dyskinesis, RC tendinopathy, SA bursitis, impingement.   Plan: PT referral. Consider steroid injection if not improving. Continue conservative management for pain.   Follow up as needed. All questions answered.      Scapular dyskinesis  -     Ambulatory Referral/Consult to Physical Therapy; Future; Expected date: 03/31/2025    Tendinopathy of left rotator cuff  -     Ambulatory Referral/Consult to Physical Therapy; Future; Expected date: 03/31/2025    Subacromial bursitis of left shoulder joint    Shoulder impingement             A copy of today's visit note has been sent to the referring provider.     Electronically signed:  Josh Penn MD, MPH  03/24/2025  9:37 AM         [1]   Social History  Socioeconomic History    Marital status:    Tobacco Use    Smoking status: Former     Types: Cigarettes    Smokeless tobacco: Never   Substance and Sexual Activity    Alcohol use: Yes     Comment: rarely    Drug use: Yes     Frequency: 7.0 times per week     Types: Marijuana     Comment: daily    Sexual activity: Yes     Partners: Male     Birth control/protection: Condom      Social Drivers of Health     Financial Resource Strain: Low Risk  (10/15/2024)    Overall Financial Resource Strain (CARDIA)     Difficulty of Paying Living Expenses: Not very hard   Food Insecurity: No Food Insecurity (10/15/2024)    Hunger Vital Sign     Worried About Running Out of Food in the Last Year: Never true     Ran Out of Food in the Last Year: Never true   Transportation Needs: No Transportation Needs (10/11/2019)    PRAPARE - Transportation     Lack of Transportation (Medical): No     Lack of Transportation (Non-Medical): No   Physical Activity: Sufficiently Active (10/15/2024)    Exercise Vital Sign     Days of Exercise per Week: 3 days     Minutes of Exercise per Session: 60 min   Stress: Stress Concern Present (10/15/2024)    [place] [employer] - Occupational Stress Questionnaire     Feeling of Stress : Rather much   Housing Stability: Unknown (10/15/2024)    Housing Stability Vital Sign     Unable to Pay for Housing in the Last Year: No

## 2025-03-24 NOTE — PATIENT INSTRUCTIONS
Assessment:  Guilherme Davenport is a 30 y.o. female   Chief Complaint   Patient presents with    Left Shoulder - Pain       Encounter Diagnoses   Name Primary?    Scapular dyskinesis Yes    Tendinopathy of left rotator cuff     Subacromial bursitis of left shoulder joint     Shoulder impingement         Plan:  Referral to Physical Therapy - [facility]  Lidocaine patches can be bought over the counter   Apply topical diclofenac (Voltaren) up to 4 times a day to the affected area.  It can be bought over the counter at any local pharmacy.    Patient can use ice every 2 hours for 15 minutes as needed    Follow-up: as needed.    Thank you for choosing [facility] and Dr. Josh Penn for your orthopedic & sports medicine care. It is our goal to provide you with exceptional care that will help keep you healthy, active, and get you back in the game.    Please do not hesitate to reach out to us via email, phone, or MyChart with any questions, concerns, or feedback.    If you felt that you received exemplary care today, please consider leaving us feedback on [email or web address] at:  [email or web address]    If you are experiencing pain/discomfort ,or have questions after 5pm and would like to be connected to the [facility] on-call team, please call this number and specify which Sports Medicine provider is treating you: [phone]

## 2025-03-26 ENCOUNTER — HOSPITAL ENCOUNTER (OUTPATIENT)
Dept: RADIOLOGY | Facility: HOSPITAL | Age: 30
Discharge: HOME | End: 2025-03-26
Attending: STUDENT IN AN ORGANIZED HEALTH CARE EDUCATION/TRAINING PROGRAM
Payer: COMMERCIAL

## 2025-03-26 PROCEDURE — 73030 X-RAY EXAM OF SHOULDER: CPT | Mod: 26,LT,, | Performed by: STUDENT IN AN ORGANIZED HEALTH CARE EDUCATION/TRAINING PROGRAM

## 2025-03-26 PROCEDURE — 73030 X-RAY EXAM OF SHOULDER: CPT | Mod: TC,PO,LT

## 2025-03-31 ENCOUNTER — CLINICAL SUPPORT (OUTPATIENT)
Dept: REHABILITATION | Facility: HOSPITAL | Age: 30
End: 2025-03-31
Attending: STUDENT IN AN ORGANIZED HEALTH CARE EDUCATION/TRAINING PROGRAM
Payer: COMMERCIAL

## 2025-03-31 DIAGNOSIS — Z74.09 IMPAIRED MOBILITY AND ACTIVITIES OF DAILY LIVING: Primary | ICD-10-CM

## 2025-03-31 DIAGNOSIS — M67.912 TENDINOPATHY OF LEFT ROTATOR CUFF: ICD-10-CM

## 2025-03-31 DIAGNOSIS — G25.89 SCAPULAR DYSKINESIS: ICD-10-CM

## 2025-03-31 DIAGNOSIS — Z78.9 IMPAIRED MOBILITY AND ACTIVITIES OF DAILY LIVING: Primary | ICD-10-CM

## 2025-03-31 PROCEDURE — 97161 PT EVAL LOW COMPLEX 20 MIN: CPT | Mod: PN | Performed by: GENERAL ACUTE CARE HOSPITAL

## 2025-03-31 PROCEDURE — 97112 NEUROMUSCULAR REEDUCATION: CPT | Mod: PN | Performed by: GENERAL ACUTE CARE HOSPITAL

## 2025-03-31 NOTE — PROGRESS NOTES
Outpatient Rehab    Physical Therapy Evaluation    Patient Name: Guilherme Davenport  MRN: [id number]  YOB: 1995  Encounter Date: 3/31/2025    Therapy Diagnosis:   Encounter Diagnoses   Name Primary?    Scapular dyskinesis     Tendinopathy of left rotator cuff     Impaired mobility and activities of daily living Yes     Physician: Josh Penn MD    Physician Orders: Eval and Treat  Medical Diagnosis: Scapular dyskinesis  Tendinopathy of left rotator cuff    Visit # / Visits Authorized:  1 / 1  Insurance Authorization Period: 3/24/2025 to 12/31/2025  Date of Evaluation: 3/31/2025  Plan of Care Certification: 3/31/2025 to 5/12/25 (6 weeks)    Time In: 1005   Time Out: 1100  Total Time: 55   Total Billable Time:      Intake Outcome Measure for FOTO Survey    Therapist reviewed FOTO scores for Guilherme Davenport on 3/31/2025.   FOTO report - see Media section or FOTO account episode details.     Intake Score: 55 (Shoulder)%    Precautions  Right Upper Extremity Weight-Bearing Status: Weight-bearing as tolerated  Left Upper Extremity Weight-Bearing Status: Weight-bearing as tolerated         Subjective   History of Present Illness  Guilherme is a 30 y.o. female who reports to physical therapy with a chief concern of L should pain with repeitive activites or fast jerking motions.     The patient reports a medical diagnosis of Scapular dyskensia.  Patient reports a surgery of N/A.          Dominant Hand: Right  History of Present Condition/Illness: 1-2 year history of L shoulder pain with activities of daily living impacting function, tolerance and desire to perform activities regularly. Patient reports insidious onset of these symptoms approx 2 years ago.     Activities of Daily Living  Social history was obtained from Patient.    General Prior Level of Function Comments: independent  General Current Level of Function Comments: modififed indepenedent  Patient Responsibilities: Community  mobility, Driving, Financial management, Health management, Home management, Laundry, Meal prep, Yard work, Shopping, Personal ADL    Previously independent with activities of daily living? Yes                Previously independent with instrumental activities of daily living? Yes                    Pain     Patient reports a current pain level of 1/10. Pain at best is reported as 1/10. Pain at worst is reported as 8/10.   Location: deep posterolateral GH joint  Clinical Progression (since onset): Unchanged  Pain Qualities: Aching, Knife-like, Sharp  Pain-Relieving Factors: Movement, Relaxation, Rest  Fast jerking movements. Pain worse with internal and external rotation.       Review of Systems  Patient reports: Muscle Tension and Stiffness  Patient denies: Joint Swelling and Neck Pain        Treatment History  Treatments  Previously Received Treatments: No  Currently Receiving Treatments: No    Living Arrangements  Living Situation  Housing: Home independently  Living Arrangements: Spouse/significant other        Employment  Patient reports: Does the patient's condition impact their ability to work?  Employment Status: Employed part-time   Self-employed [employer]       Past Medical History/Physical Systems Review:   Guilherme Davenport  has a past medical history of Acquired hypothyroidism, Encounter for long-term (current) use of medications, Endometriosis, unspecified, Fatty liver, Hyperlipidemia, and Microcytic anemia.    Guilherme Davenport  has a past surgical history that includes Tonsillectomy (2002); Surgical removal of endometriosis (Bilateral, 2017); Colonoscopy (N/A, 12/11/2024); and Esophagogastroduodenoscopy (N/A, 1/7/2025).    Guilherme has a current medication list which includes the following prescription(s): famotidine and metformin.    Review of patient's allergies indicates:  No Known Allergies     Objective      Shoulder Range of Motion  Right Shoulder   Active (deg) Passive (deg) Pain    Flexion 175       Extension 65       Scaption         ABduction 175       ADduction         Horizontal ABduction         Horizontal ADduction         External Rotation (Shoulder ABducted 0 degrees)         External Rotation (Shoulder ABducted 45 degrees)         External Rotation (Shoulder ABducted 90 degrees) 100       Internal Rotation (Shoulder ABducted 0 degrees)         Internal Rotation (Shoulder ABducted 45 degrees)         Internal Rotation (Shoulder ABducted 90 degrees) 85         Left Shoulder   Active (deg) Passive (deg) Pain   Flexion 155       Extension 55       Scaption         ABduction 150       ADduction         Horizontal ABduction         Horizontal ADduction         External Rotation (Shoulder ABducted 0 degrees)         External Rotation (Shoulder ABducted 45 degrees)         External Rotation (Shoulder ABducted 90 degrees) 105       Internal Rotation (Shoulder ABducted 0 degrees)         Internal Rotation (Shoulder ABducted 45 degrees)         Internal Rotation (Shoulder ABducted 90 degrees) 55                     Shoulder Strength - Planes of Motion   Right Strength Right Pain Left Strength Left  Pain   Flexion 5   4-     Extension 5         ABduction 5   4-     ADduction 5         Horizontal ABduction 5         Horizontal ADduction 5         Internal Rotation 0° 5   4+     Internal Rotation 90° 5         External Rotation 0° 5   3+     External Rotation 90° 5               Right  Strength  Right Hand Dynamometer Position: 2  Elbow Position Forearm Position Trial 1 (lbs) Trial 2  (lbs) Trial 3  (lbs) Average  (lbs) Pain   Flexed Neutral 74 72 60 68.67         Left  Strength  Left Hand Dynamometer Position: 2  Elbow Position Forearm Position Trial 1 (lbs) Trial 2 (lbs) Trial 3 (lbs) Average (lbs) Pain   Flexed Neutral 70 60 55 61.67                   Treatment:  Balance/Neuromuscular Re-Education  NMR 1: Home Exercise Program Review:    Time Entry(in minutes):       Assessment & Plan    Assessment  Guilherme presents with a condition of Low complexity.   Presentation of Symptoms: Stable  Will Comorbidities Impact Care: No       Functional Limitations: Activity tolerance, Bed mobility, Completing self-care activities, Completing work/school activities, Disrupted sleep pattern, Driving, Functional mobility, Range of motion, Proprioception, Participating in leisure activities, Pain with ADLs/IADLs, Pain when reaching, Manipulating objects, Reaching  Impairments: Impaired physical strength, Abnormal muscle tone, Abnormal muscle firing, Impaired cognition, Abnormal or restricted range of motion, Activity intolerance, Pain with functional activity, Weight-bearing intolerance  Personal Factors Affecting Prognosis: Physical limitations, Motivation, Pain, Financial    Patient Goal for Therapy (PT): to reduce pain  Prognosis: Good  Prognosis Details: Patient is motivated but does report potential financial barriers to treatment/sessions  Assessment Details: Patient is a 27yo F presenting to outpatient physical therapy with orders to eval and treat for L shoulder pain with s/s consistent with L scapular dyskinsia and shoulder impingement. She displays joint hypermobility globally and has a decreased ability to maintain scapular control on the L shoulder with end range and weight bearing movements. This impingement pain is causing impaired muscular function and strength as well as impacting end range of motion for the L shoulder       Patient's spiritual, cultural, and educational needs considered and patient agreeable to plan of care and goals.     Education  Education was done with Patient. The patient's learning style includes Demonstration and Pictures/video. The patient Demonstrates understanding and Verbalizes understanding.                 Goals:   Active       Long Term Goals        Patient to lift 5# overhead without pain or instability to       Start:  03/31/25    Expected End:  05/12/25             Patient to improve FOTO to 65/100 or greater to impact work related tasks crocheting       Start:  03/31/25    Expected End:  05/12/25               Short Term Goals       Patient to improve L shoulder active range of motion flexion & Abduction to 160 degrees without pain to impact activities of daily living and grooming       Start:  03/31/25    Expected End:  04/21/25            patient to improve FOTO to 60/100 or greater to displays improved activity tolerance        Start:  03/31/25    Expected End:  04/21/25                Milena Abbasi, PT, DPT

## 2025-04-11 ENCOUNTER — CLINICAL SUPPORT (OUTPATIENT)
Dept: REHABILITATION | Facility: HOSPITAL | Age: 30
End: 2025-04-11
Payer: COMMERCIAL

## 2025-04-11 DIAGNOSIS — Z78.9 IMPAIRED MOBILITY AND ACTIVITIES OF DAILY LIVING: Primary | ICD-10-CM

## 2025-04-11 DIAGNOSIS — Z74.09 IMPAIRED MOBILITY AND ACTIVITIES OF DAILY LIVING: Primary | ICD-10-CM

## 2025-04-11 PROCEDURE — 97110 THERAPEUTIC EXERCISES: CPT | Mod: PN | Performed by: GENERAL ACUTE CARE HOSPITAL

## 2025-04-11 PROCEDURE — 97112 NEUROMUSCULAR REEDUCATION: CPT | Mod: PN | Performed by: GENERAL ACUTE CARE HOSPITAL

## 2025-04-11 NOTE — PROGRESS NOTES
Outpatient Rehab  Physical Therapy Visit    Patient Name: Guilherme Davenport  MRN: [id number]  YOB: 1995  Encounter Date: 4/11/2025    Therapy Diagnosis:   Encounter Diagnosis   Name Primary?    Impaired mobility and activities of daily living Yes     Physician: Josh Penn MD    Physician Orders: Eval and Treat  Medical Diagnosis: Scapular dyskinesis  Tendinopathy of left rotator cuff    Visit # / Visits Authorized:  1 / 10  Insurance Authorization Period: 3/31/2025 to 12/31/2025  Date of Evaluation: 3/31/2025  Plan of Care Certification: 3/31/2025 to 5/12/25 (6 weeks)     PT/PTA: PT   Number of PTA visits since last PT visit:0  Time In: 0800   Time Out: 0900  Total Time: 60   Total Billable Time:  40    FOTO:  Intake Score:  %  Survey Score 1:  %  Survey Score 2:  %         Subjective   Patient returns to outpatient physical therapy for her 1st tx session. She reports reduction in numbness after initiating her home exercise program..  Pain reported as 1/10.      Objective            Treatment:     CPT Interventions & Parameters   TE  1(10) UBE 10 minutes - forward - L1.5  HEP review - updated resistance to green & blue band    NMR  2(30) Rows 3x10- green theraband   Internal rotation - red theraband - 3x10 bilateral  external rotation - red theraband - 3x10 bilateral   Extensions - green theraband - 3x10  Supine protraction 2# 3x10 - bilateral  Sidelying external rotation 2# 3x10 - bilateral  Clock taps at wall 3x5 bilateral- yellow band       *A portion of this treatment session is provided with the assistance of a skilled rehabilitation technician under the supervision of a licensed physical therapist  *Billing accurately reflects 1:1 treatment time per patient's insurance guidelines.     Home Exercises and Patient Education Reviewed   Patient was educated on the role of Physical Therapy, Treatment plan, Discharge Goals, Home Exercise Program.  Patient educated on biomechanical  justification for therapeutic exercise and importance of compliance with Home Exercise Program in order to improve overall impairments and Quality of Life.  Patient was educated on all the above exercise prior/during/after for proper posture, positioning, and execution for safe performance with home exercise program.     Disclaimer: This note was prepared using a voice recognition system and is likely to have sound alike errors within the text.    Time Entry(in minutes):  Neuromuscular Re-Education Time Entry: 30  Therapeutic Exercise Time Entry: 15    Assessment & Plan   Assessment: Patient performs well with initial treatment session. She is educated on safe and effective use of clinic equipment today. Mild verbal and visual cueing required for correct exercise performance.  Evaluation/Treatment Tolerance: Patient tolerated treatment well    Patient will continue to benefit from skilled outpatient physical therapy to address the deficits listed in the problem list box on initial evaluation, provide pt/family education and to maximize pt's level of independence in the home and community environment.     Patient's spiritual, cultural, and educational needs considered and patient agreeable to plan of care and goals.           Plan: Continue with current program. HEP compliance encouraged.    Goals:   Active       Long Term Goals        Patient to lift 5# overhead without pain or instability to (Progressing)       Start:  03/31/25    Expected End:  05/12/25            Patient to improve FOTO to 65/100 or greater to impact work related tasks crocheting (Progressing)       Start:  03/31/25    Expected End:  05/12/25               Short Term Goals       Patient to improve L shoulder active range of motion flexion & Abduction to 160 degrees without pain to impact activities of daily living and grooming (Progressing)       Start:  03/31/25    Expected End:  04/21/25            patient to improve FOTO to 60/100 or greater  to displays improved activity tolerance  (Progressing)       Start:  03/31/25    Expected End:  04/21/25                Milena Abbasi, PT, DPT

## 2025-04-28 ENCOUNTER — CLINICAL SUPPORT (OUTPATIENT)
Dept: REHABILITATION | Facility: HOSPITAL | Age: 30
End: 2025-04-28
Payer: COMMERCIAL

## 2025-04-28 DIAGNOSIS — Z74.09 IMPAIRED MOBILITY AND ACTIVITIES OF DAILY LIVING: Primary | ICD-10-CM

## 2025-04-28 DIAGNOSIS — Z78.9 IMPAIRED MOBILITY AND ACTIVITIES OF DAILY LIVING: Primary | ICD-10-CM

## 2025-04-28 PROCEDURE — 97110 THERAPEUTIC EXERCISES: CPT | Mod: PN | Performed by: GENERAL ACUTE CARE HOSPITAL

## 2025-04-28 PROCEDURE — 97112 NEUROMUSCULAR REEDUCATION: CPT | Mod: PN | Performed by: GENERAL ACUTE CARE HOSPITAL

## 2025-04-28 PROCEDURE — 97014 ELECTRIC STIMULATION THERAPY: CPT | Mod: PN | Performed by: GENERAL ACUTE CARE HOSPITAL

## 2025-04-28 PROCEDURE — 97010 HOT OR COLD PACKS THERAPY: CPT | Mod: PN | Performed by: GENERAL ACUTE CARE HOSPITAL

## 2025-04-28 NOTE — PROGRESS NOTES
Outpatient Rehab    Physical Therapy Visit    Patient Name: Guilherme Davenport  MRN: [id number]  YOB: 1995  Encounter Date: 4/28/2025    Therapy Diagnosis:   Encounter Diagnosis   Name Primary?    Impaired mobility and activities of daily living Yes     Physician: Josh Penn MD    Physician Orders: Eval and Treat  Medical Diagnosis: Scapular dyskinesis  Tendinopathy of left rotator cuff    Visit # / Visits Authorized:  2 / 10  Insurance Authorization Period: 3/31/2025 to 12/31/2025  Date of Evaluation: 3/31/2025  Plan of Care Certification: 3/31/2025 to 5/12/25 (6 weeks)     PT/PTA: PT   Number of PTA visits since last PT visit:0  Time In: 0900   Time Out: 1000  Total Time: 60   Total Billable Time:      FOTO:  Intake Score:  %  Survey Score 1:  %  Survey Score 2:  %         Subjective   Patient returns outpatient physical therapy after canceling her session last week due to moving. Patient states moderate increase in pain and soreness in the left shoulder due to excessive lifting tasks at home. She reports poor compliance with home exercise program in the last two weeks..  Pain reported as 4/10. Fast jerking movements. Pain worse with internal and external rotation.     Objective            Treatment:     CPT Interventions & Parameters   TE  1(8) UBE 5 minutes forward   Home exercise program - review & update    NMR  2(30) Internal rotation - red theraband - 3x10 bilateral  external rotation - red theraband - 3x10 bilateral   Extensions - green theraband - 3x10  Supine protraction 3# 3x15 - bilateral  Sidelying external rotation 2# 3x10 - bilateral  Clock taps at wall 3x5 bilateral- red band   Bilateral external rotation - red bacn 3x10   H/C  1(x)  +  UES  1(x) Cold pack L shoulder with IFC x 10 minutes       *A portion of this treatment session is provided with the assistance of a skilled rehabilitation technician under the supervision of a licensed physical therapist  *Billing  accurately reflects 1:1 treatment time per patient's insurance guidelines.     Home Exercises and Patient Education Reviewed   Patient was educated on the role of Physical Therapy, Treatment plan, Discharge Goals, Home Exercise Program.  Patient educated on biomechanical justification for therapeutic exercise and importance of compliance with Home Exercise Program in order to improve overall impairments and Quality of Life.  Patient was educated on all the above exercise prior/during/after for proper posture, positioning, and execution for safe performance with home exercise program.     Disclaimer: This note was prepared using a voice recognition system and is likely to have sound alike errors within the text.    Time Entry(in minutes):  Neuromuscular Re-Education Time Entry: 30  Therapeutic Exercise Time Entry: 10    Assessment & Plan   Assessment: Patient performs while during session today with mild verbal and tactile queuing required for correct exercise performance. We are able to slightly increase resistance and reps with progressive resistance, exercise program. Patient reports pain relief with addition of inferential current with cold pack to left shoulder Post session.  Evaluation/Treatment Tolerance: Patient tolerated treatment well    Patient will continue to benefit from skilled outpatient physical therapy to address the deficits listed in the problem list box on initial evaluation, provide pt/family education and to maximize pt's level of independence in the home and community environment.     Patient's spiritual, cultural, and educational needs considered and patient agreeable to plan of care and goals.           Plan: Continue with current program approximately one to two more weeks before discharge due to patient starting a new job and being unable to attend physical therapy Beyond that time.    Goals:   Active       Long Term Goals        Patient to lift 5# overhead without pain or instability to  (Progressing)       Start:  03/31/25    Expected End:  05/12/25            Patient to improve FOTO to 65/100 or greater to impact work related tasks crocheting (Progressing)       Start:  03/31/25    Expected End:  05/12/25               Short Term Goals       Patient to improve L shoulder active range of motion flexion & Abduction to 160 degrees without pain to impact activities of daily living and grooming (Progressing)       Start:  03/31/25    Expected End:  04/21/25            patient to improve FOTO to 60/100 or greater to displays improved activity tolerance  (Progressing)       Start:  03/31/25    Expected End:  04/21/25                Milena Abbasi, PT, DPT

## 2025-05-06 ENCOUNTER — CLINICAL SUPPORT (OUTPATIENT)
Dept: REHABILITATION | Facility: HOSPITAL | Age: 30
End: 2025-05-06
Payer: COMMERCIAL

## 2025-05-06 DIAGNOSIS — Z78.9 IMPAIRED MOBILITY AND ACTIVITIES OF DAILY LIVING: Primary | ICD-10-CM

## 2025-05-06 DIAGNOSIS — Z74.09 IMPAIRED MOBILITY AND ACTIVITIES OF DAILY LIVING: Primary | ICD-10-CM

## 2025-05-06 PROCEDURE — 97110 THERAPEUTIC EXERCISES: CPT | Mod: PN | Performed by: GENERAL ACUTE CARE HOSPITAL

## 2025-05-06 PROCEDURE — 97010 HOT OR COLD PACKS THERAPY: CPT | Mod: PN | Performed by: GENERAL ACUTE CARE HOSPITAL

## 2025-05-06 PROCEDURE — 97112 NEUROMUSCULAR REEDUCATION: CPT | Mod: PN | Performed by: GENERAL ACUTE CARE HOSPITAL

## 2025-05-06 PROCEDURE — 97014 ELECTRIC STIMULATION THERAPY: CPT | Mod: PN | Performed by: GENERAL ACUTE CARE HOSPITAL

## 2025-05-06 NOTE — PROGRESS NOTES
Outpatient Rehab  Physical Therapy Visit    Patient Name: Giulherme Davenport  MRN: [id number]  YOB: 1995  Encounter Date: 5/6/2025    Therapy Diagnosis:   Encounter Diagnosis   Name Primary?    Impaired mobility and activities of daily living Yes     Physician: Josh Penn MD    Physician Orders: Eval and Treat  Medical Diagnosis: Scapular dyskinesis  Tendinopathy of left rotator cuff    Visit # / Visits Authorized:  3 / 10  Insurance Authorization Period: 3/31/2025 to 12/31/2025  Date of Evaluation: 3/31/2025  Plan of Care Certification: 3/31/2025 to 5/12/25 (6 weeks)     PT/PTA: PT   Number of PTA visits since last PT visit:0  Time In: 0800   Time Out: 0900  Total Time (in minutes): 60   Total Billable Time (in minutes):      FOTO:  Intake Score:  %  Survey Score 2:  %  Survey Score 3:  %         Subjective   FOTO: 66/100  Patient returns outpatient physical therapy reporting improvement in shoulder symptoms, increase postural awareness, and decreased pain with fast motions and reactive task with the shoulder.  Pain reported as 2/10. Fast jerking movements. Pain worse with internal and external rotation.     Objective            Treatment:     CPT Interventions & Parameters   TE  1(8) UBE 3/3 minutes forward   Plan of care - HEP update / review   NMR  2(30) Banded retractions (green 3x10)  Red loop: clock taps 3x8 dilma  Red loop: dilma external rotation  Red loop: cookie traps 3x8  Quadruped protraction 3x8  Quadruped chin tucks 3x5  Quadruped thoracic rotations 2x10  Thoracic extensions in chair 3x10   H/C  1(x)  +  UES  1(x) Cold pack L shoulder with IFC x 10 minutes       *A portion of this treatment session is provided with the assistance of a skilled rehabilitation technician under the supervision of a licensed physical therapist  *Billing accurately reflects 1:1 treatment time per patient's insurance guidelines.     Home Exercises and Patient Education Reviewed   Patient was educated on  the role of Physical Therapy, Treatment plan, Discharge Goals, Home Exercise Program.  Patient educated on biomechanical justification for therapeutic exercise and importance of compliance with Home Exercise Program in order to improve overall impairments and Quality of Life.  Patient was educated on all the above exercise prior/during/after for proper posture, positioning, and execution for safe performance with home exercise program.     Disclaimer: This note was prepared using a voice recognition system and is likely to have sound alike errors within the text.    Time Entry(in minutes):  Neuromuscular Re-Education Time Entry: 39  Therapeutic Exercise Time Entry: 8    Assessment & Plan   Assessment: Improvements with function, resistance, and photo scores are noted during todays session  Evaluation/Treatment Tolerance: Patient tolerated treatment well    Patient will continue to benefit from skilled outpatient physical therapy to address the deficits listed in the problem list box on initial evaluation, provide pt/family education and to maximize pt's level of independence in the home and community environment.     Patient's spiritual, cultural, and educational needs considered and patient agreeable to plan of care and goals.           Plan: Continue with current resistive, progressive exercise program, emphasis on exercise program, independence, and transition towards discharge due to [place] of [place] in the next few weeks    Goals:   Active       Long Term Goals        Patient to lift 5# overhead without pain or instability to (Progressing)       Start:  03/31/25    Expected End:  05/12/25            Patient to improve FOTO to 65/100 or greater to impact work related tasks crocheting (Progressing)       Start:  03/31/25    Expected End:  05/12/25               Short Term Goals       Patient to improve L shoulder active range of motion flexion & Abduction to 160 degrees without pain to impact  activities of daily living and grooming (Progressing)       Start:  03/31/25    Expected End:  04/21/25            patient to improve FOTO to 60/100 or greater to displays improved activity tolerance  (Progressing)       Start:  03/31/25    Expected End:  04/21/25                Milena Abbasi, PT, DPT

## 2025-05-19 ENCOUNTER — OFFICE VISIT (OUTPATIENT)
Dept: ORTHOPEDICS | Facility: CLINIC | Age: 30
End: 2025-05-19
Payer: COMMERCIAL

## 2025-05-19 VITALS — WEIGHT: 229.5 LBS | BODY MASS INDEX: 38.24 KG/M2 | HEIGHT: 65 IN

## 2025-05-19 DIAGNOSIS — M25.512 ACUTE PAIN OF LEFT SHOULDER: ICD-10-CM

## 2025-05-19 DIAGNOSIS — S46.012A TRAUMATIC TEAR OF LEFT ROTATOR CUFF, UNSPECIFIED TEAR EXTENT, INITIAL ENCOUNTER: Primary | ICD-10-CM

## 2025-05-19 PROCEDURE — 3008F BODY MASS INDEX DOCD: CPT | Mod: CPTII,S$GLB,, | Performed by: STUDENT IN AN ORGANIZED HEALTH CARE EDUCATION/TRAINING PROGRAM

## 2025-05-19 PROCEDURE — 1159F MED LIST DOCD IN RCRD: CPT | Mod: CPTII,S$GLB,, | Performed by: STUDENT IN AN ORGANIZED HEALTH CARE EDUCATION/TRAINING PROGRAM

## 2025-05-19 PROCEDURE — 99214 OFFICE O/P EST MOD 30 MIN: CPT | Mod: S$GLB,,, | Performed by: STUDENT IN AN ORGANIZED HEALTH CARE EDUCATION/TRAINING PROGRAM

## 2025-05-19 PROCEDURE — 1160F RVW MEDS BY RX/DR IN RCRD: CPT | Mod: CPTII,S$GLB,, | Performed by: STUDENT IN AN ORGANIZED HEALTH CARE EDUCATION/TRAINING PROGRAM

## 2025-05-19 PROCEDURE — 99999 PR PBB SHADOW E&M-EST. PATIENT-LVL IV: CPT | Mod: PBBFAC,,, | Performed by: STUDENT IN AN ORGANIZED HEALTH CARE EDUCATION/TRAINING PROGRAM

## 2025-05-19 RX ORDER — HYDROCODONE BITARTRATE AND ACETAMINOPHEN 5; 325 MG/1; MG/1
1 TABLET ORAL EVERY 8 HOURS PRN
Qty: 21 TABLET | Refills: 0 | Status: SHIPPED | OUTPATIENT
Start: 2025-05-19 | End: 2025-05-26

## 2025-05-19 NOTE — PROGRESS NOTES
Patient ID: Guilherme Davenport  YOB: 1995  MRN: [id number]    Chief Complaint: Pain of the Left Shoulder      Referred By: self      History of Present Illness: Guilherme Davenport is a right-hand dominant 30 y.o. female who presents today with left shoulder pain. She was here on 3/24 but since then have injured the left shoulder while getting out of the pool on 5/13/25. She states that she felt and heard a pop. Went to [facility], had xrays , she was told that there was not a fracture and that pain could possibly be coming from RTC, they suggested she have an MRI left shoulder.  Ache Is constant but worse with movement. Lifting and reaching behind is painful. She has not tried Norco for the symptoms. She had just stated PT and was doing great up until this injury.         03/24/2025 Interval History of Present Illness: Guilherme Davenport is a right-hand dominant 30 y.o. female who presents today with left shoulder pain.  Shoulder pain for the last 2 years but has progressively gotten worse. No injury, ache Is constant but worse with movement. Lifting and reaching behind is painful. Feel like pain is deep in the shoulder joint, mostly towards the back of shoulder. She has not tried anything for the symptoms. She does do little home stretching, but here to get shoulder checked out before it pain gets worse.      Occupation: self      Past Medical History:   Past Medical History:   Diagnosis Date    Acquired hypothyroidism 09/13/2019    New diagnosis.  Duration is unknown.  Patient having symptoms of fatigue hair loss or weight gain.  Lab Results Component Value Date  TSH 4.364 (H) 08/07/2019  FREET4 0.91 08/07/2019     Encounter for long-term (current) use of medications 09/13/2019 09/13/2019  Patient is on CHRONIC long-term drug therapy for managed conditions. See medication list. Reports compliance.  No side effects reported.  Routine lab work is being monitored.  Patient  does need refills today.   Lab Results Component Value Date  WBC 5.76 08/07/2019  HGB 11.2 (L) 08/07/2019  HCT 36.3 (L) 08/07/2019  MCV 78 (L) 08/07/2019   (H) 08/07/2019   CMP Sodium Date Value Re    Endometriosis, unspecified     Fatty liver 09/13/2019    EXAMINATION: US ABDOMEN LIMITED  CLINICAL HISTORY: Nausea with vomiting, unspecified  COMPARISON: None  FINDINGS: Limited right upper quadrant ultrasound performed.  The liver measures 15.4 cm in length and demonstrates increased echogenicity consistent with hepatic steatosis.  Portal vein is patent.  Common bile duct is within normal limits at 3 mm..  No gallstones, gallbladder wall thickening, o    Hyperlipidemia 09/13/2019    Chronic.  Uncontrolled.  No results found for: CHOL Lab Results Component Value Date  HDL 71 11/08/2018  Lab Results Component Value Date  LDLCALC 116 11/08/2018  Lab Results Component Value Date  TRIG 290 11/08/2018  No results found for: CHOLHDL      Microcytic anemia 09/13/2019    Chronic. Untreated.  Lab Results Component Value Date  WBC 5.76 08/07/2019  HGB 11.2 (L) 08/07/2019  HCT 36.3 (L) 08/07/2019  MCV 78 (L) 08/07/2019   (H) 08/07/2019       Past Surgical History:   Procedure Laterality Date    COLONOSCOPY N/A 12/11/2024    Procedure: COLONOSCOPY;  Surgeon: Alexi Siegel DO;  Location: [facility] ENDO;  Service: Endoscopy;  Laterality: N/A;    ESOPHAGOGASTRODUODENOSCOPY N/A 1/7/2025    Procedure: EGD (ESOPHAGOGASTRODUODENOSCOPY);  Surgeon: Alexi Siegel DO;  Location: [facility] [place] ENDO;  Service: Endoscopy;  Laterality: N/A;    SURGICAL REMOVAL OF ENDOMETRIOSIS Bilateral 2017    Dr. Kvng Koenig    TONSILLECTOMY  2002     Family History   Problem Relation Name Age of Onset    Stroke Paternal Grandmother Tisha Brar     Uterine cancer Maternal Grandmother Tanna Garrett     Arthritis Maternal Grandmother Tanna Garrett     Diabetes Maternal Grandmother Tanna Garrett     Depression Father Guilherme Maykel      Hearing loss Father Guilherme Brar     Diabetes Mother Kristyn Brar     Hashimoto's thyroiditis Mother Kristyn Brar     Gallbladder disease Sister      Breast cancer Other Maternal Great Aunt     Ovarian cancer Other Maternal Great Aunt     Colon cancer Neg Hx      Crohn's disease Neg Hx      Esophageal cancer Neg Hx      Stomach cancer Neg Hx      Ulcerative colitis Neg Hx      Celiac disease Neg Hx       Social History[1]  Medication List with Changes/Refills   New Medications    HYDROCODONE-ACETAMINOPHEN (NORCO) 5-325 MG PER TABLET    Take 1 tablet by mouth every 8 (eight) hours as needed for Pain.   Discontinued Medications    FAMOTIDINE (PEPCID) 10 MG TABLET    Take 10 mg by mouth 2 (two) times daily as needed for Heartburn.    METFORMIN (GLUCOPHAGE) 500 MG TABLET    Take 1 tablet (500 mg total) by mouth 2 (two) times daily with meals. Start with 1 tablet daily with breakfast x 14 days, then take as prescribed     Review of patient's allergies indicates:  No Known Allergies    Physical Exam:   Body mass index is 38.19 kg/m².    GENERAL: Well appearing, in no acute distress.  HEAD: Normocephalic and atraumatic.  ENT: External ears and nose grossly normal.  EYES: EOMI bilaterally  PULMONARY: Respirations are grossly even and non-labored.  NEURO: Awake, alert, and oriented x 3.  SKIN: No obvious rashes appreciated.  PSYCH: Mood & affect are appropriate.    Detailed MSK exam:     Left shoulder exam:   -extremely limited 2/2 pain. Abduction and flexion 30 degrees. Guarded on RC testing    Right shoulder exam:   -ROM: abduction 130, forward flexion 130, external rotation 90, internal rotation 80  -empty can test negative, resisted ER negative, belly press negative  -gupta test negative, neers test negative, whipple test negative  -biceps load test negative, yerguson test negative, Calloway's test negative  -sensation intact, pulses 2+  -TTP: none      Imaging:  X-Ray Shoulder 2 or More Views Left  Narrative:  EXAMINATION:  XR SHOULDER COMPLETE 2 OR MORE VIEWS LEFT    CLINICAL HISTORY:  Pain in left shoulder    TECHNIQUE:  XR SHOULDER COMPLETE 2 OR MORE VIEWS LEFT    COMPARISON:  None.    FINDINGS:  No evidence of acute fracture or dislocation.  No radiopaque foreign body seen.  Density projecting over the humeral head favored to represent calcific tendinitis.  Impression: As above.    Electronically signed by: Kvng Gonzales  Date:    03/26/2025  Time:    07:45        Relevant imaging results were reviewed and interpreted by me and per my read shows no acute abnormalities on radiographs.  This was discussed with the patient and / or family today.     Assessment:  Guilherme Davenport is a 30 y.o. female following up for chronic left shoulder pain. Had recent fall where she felt a pop and extremely guarded on exam today.   Plan: MRI ordered. Shoulder referral placed. Patient is going [place] be [place] out of [place] in [place] few weeks which might affect treatment option here before she leaves. One-time norco prescription given. Continue flexeril. Advised only using sling up to two weeks max. Continue conservative management for pain.   Follow up with shoulder specialist. All questions answered.      Traumatic tear of left rotator cuff, unspecified tear extent, initial encounter  -     MRI Shoulder Without Contrast Left; Future; Expected date: 05/19/2025  -     Ambulatory referral/consult to Orthopedics; Future; Expected date: 05/26/2025  -     HYDROcodone-acetaminophen (NORCO) 5-325 mg per tablet; Take 1 tablet by mouth every 8 (eight) hours as needed for Pain.  Dispense: 21 tablet; Refill: 0    Acute pain of left shoulder  -     MRI Shoulder Without Contrast Left; Future; Expected date: 05/19/2025  -     Ambulatory referral/consult to Orthopedics; Future; Expected date: 05/26/2025               A copy of today's visit note has been sent to the referring provider.     Electronically signed:  Josh Penn MD,  MALI  05/19/2025  9:37 AM           [1]   Social History  Socioeconomic History    Marital status:    Tobacco Use    Smoking status: Former     Types: Cigarettes    Smokeless tobacco: Never   Substance and Sexual Activity    Alcohol use: Yes     Comment: rarely    Drug use: Yes     Frequency: 7.0 times per week     Types: Marijuana     Comment: daily    Sexual activity: Yes     Partners: Male     Birth control/protection: Condom     Social Drivers of Health     Financial Resource Strain: Low Risk  (10/15/2024)    Overall Financial Resource Strain (CARDIA)     Difficulty of Paying Living Expenses: Not very hard   Food Insecurity: No Food Insecurity (10/15/2024)    Hunger Vital Sign     Worried About Running Out of Food in the Last Year: Never true     Ran Out of Food in the Last Year: Never true   Transportation Needs: No Transportation Needs (10/11/2019)    PRAPARE - Transportation     Lack of Transportation (Medical): No     Lack of Transportation (Non-Medical): No   Physical Activity: Sufficiently Active (10/15/2024)    Exercise Vital Sign     Days of Exercise per Week: 3 days     Minutes of Exercise per Session: 60 min   Stress: Stress Concern Present (10/15/2024)    [employer] - Occupational Stress Questionnaire     Feeling of Stress : Rather much   Housing Stability: Unknown (10/15/2024)    Housing Stability Vital Sign     Unable to Pay for Housing in the Last Year: No

## 2025-05-19 NOTE — PATIENT INSTRUCTIONS
Assessment:  Guilherme Davenport is a 30 y.o. female   Chief Complaint   Patient presents with    Left Shoulder - Pain       No diagnosis found.     Plan:  Stat MRI of the left shoulder  Referral to shoulder specialist in [place]  Apply topical diclofenac (Voltaren) up to 4 times a day to the affected area.  It can be bought over the counter at any local pharmacy.    Patient may use over the counter lidocaine patches as needed for pain.  Patient may use ice and heat as needed for pain every 2 hours for 15 minutes  One time prescription of Norco to be taken as directed.  Follow up with shoulder specialist in [place]          Follow-up: with shoulder specialist.    Thank you for choosing [facility] and Dr. Josh Penn for your orthopedic & sports medicine care. It is our goal to provide you with exceptional care that will help keep you healthy, active, and get you back in the game.    Please do not hesitate to reach out to us via email, phone, or MyChart with any questions, concerns, or feedback.    If you felt that you received exemplary care today, please consider leaving us feedback on [facility] at:  [email or web address]    If you are experiencing pain/discomfort ,or have questions after 5pm and would like to be connected to the [facility] on-call team, please call this number and specify which Sports Medicine provider is treating you: [phone]

## 2025-05-20 ENCOUNTER — HOSPITAL ENCOUNTER (OUTPATIENT)
Dept: RADIOLOGY | Facility: HOSPITAL | Age: 30
Discharge: HOME | End: 2025-05-20
Attending: STUDENT IN AN ORGANIZED HEALTH CARE EDUCATION/TRAINING PROGRAM
Payer: COMMERCIAL

## 2025-05-20 DIAGNOSIS — M25.512 ACUTE PAIN OF LEFT SHOULDER: ICD-10-CM

## 2025-05-20 DIAGNOSIS — S46.012A TRAUMATIC TEAR OF LEFT ROTATOR CUFF, UNSPECIFIED TEAR EXTENT, INITIAL ENCOUNTER: ICD-10-CM

## 2025-05-20 PROCEDURE — 73221 MRI JOINT UPR EXTREM W/O DYE: CPT | Mod: 26,LT,, | Performed by: RADIOLOGY

## 2025-05-20 PROCEDURE — 73221 MRI JOINT UPR EXTREM W/O DYE: CPT | Mod: TC,PO,LT
